# Patient Record
Sex: FEMALE | Race: WHITE | NOT HISPANIC OR LATINO | Employment: OTHER | URBAN - NONMETROPOLITAN AREA
[De-identification: names, ages, dates, MRNs, and addresses within clinical notes are randomized per-mention and may not be internally consistent; named-entity substitution may affect disease eponyms.]

---

## 2017-10-23 ENCOUNTER — AMBULATORY - GICH (OUTPATIENT)
Dept: RADIOLOGY | Facility: OTHER | Age: 62
End: 2017-10-23

## 2017-10-23 DIAGNOSIS — Z12.31 ENCOUNTER FOR SCREENING MAMMOGRAM FOR MALIGNANT NEOPLASM OF BREAST: ICD-10-CM

## 2017-10-26 ENCOUNTER — HOSPITAL ENCOUNTER (OUTPATIENT)
Dept: RADIOLOGY | Facility: OTHER | Age: 62
End: 2017-10-26

## 2017-10-26 ENCOUNTER — HISTORY (OUTPATIENT)
Dept: RADIOLOGY | Facility: OTHER | Age: 62
End: 2017-10-26

## 2017-10-26 DIAGNOSIS — Z12.31 ENCOUNTER FOR SCREENING MAMMOGRAM FOR MALIGNANT NEOPLASM OF BREAST: ICD-10-CM

## 2017-12-27 NOTE — PROGRESS NOTES
Patient Information     Patient Name MRN Sex Jayashree Villa [id number] Female 1955      Progress Notes by Svetlana Rachel at 10/26/2017  9:58 AM     Author:  Svetlana Rachel Service:  (none) Author Type:  (none)     Filed:  10/26/2017  9:58 AM Date of Service:  10/26/2017  9:58 AM Status:  Signed     :  Svetlana Rachel            Falls Risk Criteria:    Age 65 and older or under age 4        Sensory deficits    Poor vision    Use of ambulatory aides    Impaired judgment    Unable to walk independently    Meets High Risk criteria for falls:  no

## 2018-02-07 ENCOUNTER — DOCUMENTATION ONLY (OUTPATIENT)
Dept: FAMILY MEDICINE | Facility: OTHER | Age: 63
End: 2018-02-07

## 2019-05-20 ENCOUNTER — MEDICAL CORRESPONDENCE (OUTPATIENT)
Dept: LAB | Facility: OTHER | Age: 64
End: 2019-05-20

## 2019-05-20 DIAGNOSIS — Z01.812 PRE-PROCEDURE LAB EXAM: ICD-10-CM

## 2019-05-20 LAB
CREAT SERPL-MCNC: 0.67 MG/DL (ref 0.6–1.2)
GFR SERPL CREATININE-BSD FRML MDRD: 89 ML/MIN/{1.73_M2}

## 2019-05-20 PROCEDURE — 82565 ASSAY OF CREATININE: CPT

## 2019-05-20 PROCEDURE — 36415 COLL VENOUS BLD VENIPUNCTURE: CPT

## 2019-05-22 ENCOUNTER — HOSPITAL ENCOUNTER (OUTPATIENT)
Dept: MRI IMAGING | Facility: OTHER | Age: 64
Discharge: HOME | End: 2019-05-22
Attending: FAMILY MEDICINE | Admitting: FAMILY MEDICINE
Payer: COMMERCIAL

## 2019-05-22 DIAGNOSIS — G45.9 TIA (TRANSIENT ISCHEMIC ATTACK): ICD-10-CM

## 2019-05-22 PROCEDURE — A9575 INJ GADOTERATE MEGLUMI 0.1ML: HCPCS | Performed by: RADIOLOGY

## 2019-05-22 PROCEDURE — 70553 MRI BRAIN STEM W/O & W/DYE: CPT

## 2019-05-22 PROCEDURE — 25500064 ZZH RX 255 OP 636: Performed by: RADIOLOGY

## 2019-05-22 RX ADMIN — GADOTERATE MEGLUMINE 17 ML: 376.9 INJECTION INTRAVENOUS at 12:39

## 2020-05-26 ENCOUNTER — HOSPITAL ENCOUNTER (OUTPATIENT)
Dept: GENERAL RADIOLOGY | Facility: OTHER | Age: 65
End: 2020-05-26
Attending: NURSE PRACTITIONER
Payer: COMMERCIAL

## 2020-05-26 ENCOUNTER — OFFICE VISIT (OUTPATIENT)
Dept: FAMILY MEDICINE | Facility: OTHER | Age: 65
End: 2020-05-26
Attending: NURSE PRACTITIONER
Payer: COMMERCIAL

## 2020-05-26 VITALS
TEMPERATURE: 97.8 F | DIASTOLIC BLOOD PRESSURE: 78 MMHG | HEIGHT: 64 IN | BODY MASS INDEX: 32.61 KG/M2 | SYSTOLIC BLOOD PRESSURE: 130 MMHG | HEART RATE: 83 BPM | WEIGHT: 191 LBS

## 2020-05-26 DIAGNOSIS — S62.292A: ICD-10-CM

## 2020-05-26 DIAGNOSIS — S62.92XA CLOSED FRACTURE OF LEFT HAND, INITIAL ENCOUNTER: Primary | ICD-10-CM

## 2020-05-26 DIAGNOSIS — S62.325A CLOSED DISPLACED FRACTURE OF SHAFT OF FOURTH METACARPAL BONE OF LEFT HAND, INITIAL ENCOUNTER: ICD-10-CM

## 2020-05-26 DIAGNOSIS — M79.642 PAIN OF LEFT HAND: ICD-10-CM

## 2020-05-26 PROBLEM — S62.323A CLOSED DISPLACED FRACTURE OF SHAFT OF THIRD METACARPAL BONE OF LEFT HAND, INITIAL ENCOUNTER: Status: ACTIVE | Noted: 2020-05-26

## 2020-05-26 PROBLEM — S62.323A CLOSED DISPLACED FRACTURE OF SHAFT OF THIRD METACARPAL BONE OF LEFT HAND, INITIAL ENCOUNTER: Status: RESOLVED | Noted: 2020-05-26 | Resolved: 2020-05-26

## 2020-05-26 PROCEDURE — 99203 OFFICE O/P NEW LOW 30 MIN: CPT | Performed by: NURSE PRACTITIONER

## 2020-05-26 PROCEDURE — 73130 X-RAY EXAM OF HAND: CPT | Mod: LT

## 2020-05-26 RX ORDER — SPIRONOLACTONE 25 MG/1
25 TABLET ORAL DAILY
COMMUNITY
Start: 2020-05-07

## 2020-05-26 RX ORDER — NADOLOL 40 MG/1
TABLET ORAL
COMMUNITY
Start: 2020-03-20

## 2020-05-26 RX ORDER — FUROSEMIDE 20 MG
20 TABLET ORAL DAILY
COMMUNITY
Start: 2020-04-22

## 2020-05-26 RX ORDER — NADOLOL 20 MG/1
TABLET ORAL
COMMUNITY
Start: 2020-05-05

## 2020-05-26 RX ORDER — MONTELUKAST SODIUM 10 MG/1
1 TABLET ORAL DAILY
COMMUNITY
Start: 2019-09-24

## 2020-05-26 RX ORDER — LOSARTAN POTASSIUM 50 MG/1
100 TABLET ORAL DAILY
COMMUNITY
Start: 2020-03-20

## 2020-05-26 SDOH — HEALTH STABILITY: MENTAL HEALTH: HOW OFTEN DO YOU HAVE A DRINK CONTAINING ALCOHOL?: 2-4 TIMES A MONTH

## 2020-05-26 SDOH — HEALTH STABILITY: MENTAL HEALTH: HOW MANY STANDARD DRINKS CONTAINING ALCOHOL DO YOU HAVE ON A TYPICAL DAY?: 3 OR 4

## 2020-05-26 ASSESSMENT — MIFFLIN-ST. JEOR: SCORE: 1401.37

## 2020-05-26 ASSESSMENT — PAIN SCALES - GENERAL: PAINLEVEL: NO PAIN (0)

## 2020-05-26 NOTE — PATIENT INSTRUCTIONS
Patient Education     Closed Hand Fracture (Adult)  You have a fracture, or broken bone, in your hand. This may be a small crack or chip in the bone. Or it may be a major break with the broken parts pushed out of place. A closed fracture means that the broken bone has not gone through the skin. A hand fracture is often treated with a splint or cast. It usually takes 4 to 6 weeks to heal. Severe injuries may require surgery.  Home care    Keep your arm raised to reduce pain and swelling. When sitting or lying down, raise your arm above heart level. You can do this by placing your arm on a pillow that rests on your chest or on a pillow at your side. This is most important during the first 48 hours after injury.    Apply an ice pack over the injured area for no more than 15 to 20 minutes. Do this every 1 to 2 hours for the first 24 to 48 hours. Continue with ice packs as needed to ease pain and swelling. To make an ice pack, put ice cubes in a plastic bag that seals at the top. Wrap the bag in a clean, thin towel or cloth. Never put ice or an ice pack directly on the skin. You can place the ice pack inside the sling and directly over the cast or splint. As the ice melts, be careful that the cast or splint doesn t get wet.    Keep the cast or splint completely dry at all times. Bathe with your cast or splint out of the water, protected with 2 large plastic bags. Place 1 bag outside the other. Tape each bag with duct tape at the top end or use rubber bands. If a fiberglass cast or splint gets wet, dry it with a hair dryer on a cool setting.    You may use over-the-counter pain medicine to control pain, unless another pain medicine was prescribed. If you have chronic liver or kidney disease or ever had a stomach ulcer or GI bleeding, talk with your provider before using these medicines.    Follow-up care  Follow up with your healthcare provider in 1 week, or as advised. This is to be sure the bone is healing correctly.  If you were given a splint, it may be changed to a cast at your follow-up visit.  If X-rays were taken, you will be told of any new findings that may affect your care.  When to seek medical advice  Call your healthcare provider right away if any of these occur:    The plaster cast or splint becomes wet or soft    The fiberglass cast or splint stays wet for more than 24 hours    The cast or splint has a bad smell    The plaster cast or splint becomes loose    There is increased tightness or pain under the cast or splint    The fingers on your injured hand become swollen, cold, blue, numb, or tingly  Date Last Reviewed: 4/1/2018 2000-2019 [employer]. [address] [place] [address], [place] [address]. All rights reserved. This information is not intended as a substitute for professional medical care. Always follow your healthcare professional's instructions.

## 2020-05-26 NOTE — H&P (VIEW-ONLY)
HPI:    Jayashree Vieira is a 64 year old female who presents to clinic today for left hand injury after a fall.  On Sunday she lost her balance and went to catch herself on her couch.  She missed the cushions and jammed her left hand into the couch frame.  She has had mild pain.  She has significant swelling and bruising especially over her third metacarpal.  Decreased range of motion likely due to swelling.  She has been treating this with Tylenol and ice.  No previous fractures to this hand.    History reviewed. No pertinent past medical history.    History reviewed. No pertinent surgical history.    Family History   Problem Relation Age of Onset     Breast Cancer No family hx of         Cancer-breast       Social History     Socioeconomic History     Marital status: Unknown     Spouse name: Not on file     Number of children: Not on file     Years of education: Not on file     Highest education level: Not on file   Occupational History     Not on file   Social Needs     Financial resource strain: Not on file     Food insecurity     Worry: Not on file     Inability: Not on file     Transportation needs     Medical: Not on file     Non-medical: Not on file   Tobacco Use     Smoking status: Former Smoker     Smokeless tobacco: Never Used   Substance and Sexual Activity     Alcohol use: Yes     Frequency: 2-4 times a month     Drinks per session: 3 or 4     Drug use: Not on file     Sexual activity: Not on file   Lifestyle     Physical activity     Days per week: Not on file     Minutes per session: Not on file     Stress: Not on file   Relationships     Social connections     Talks on phone: Not on file     Gets together: Not on file     Attends Restorationist service: Not on file     Active member of club or organization: Not on file     Attends meetings of clubs or organizations: Not on file     Relationship status: Not on file     Intimate partner violence     Fear of current or ex partner: Not on file      "Emotionally abused: Not on file     Physically abused: Not on file     Forced sexual activity: Not on file   Other Topics Concern     Not on file   Social History Narrative     Not on file       Current Outpatient Medications   Medication Sig Dispense Refill     furosemide (LASIX) 20 MG tablet Take 20 mg by mouth daily       losartan (COZAAR) 50 MG tablet Take 50 mg by mouth daily       montelukast (SINGULAIR) 10 MG tablet Take 1 tablet by mouth daily       nadolol (CORGARD) 20 MG tablet TAKE 1 TABLET BY MOUTH DAILY ALONG WITH 40MG TAB TO EQUAL 60MG       nadolol (CORGARD) 40 MG tablet TAKE 1 TABLET BY MOUTH RICH Y ALONG WITH 20MG TABLET TO EQUAL 60MG TOTAL       spironolactone (ALDACTONE) 25 MG tablet TAKE 1-2 TABLETS BY MOUTH EVERY DAY AS DIRECTED         No Known Allergies    ROS:  Pertinent positives and negatives are noted in HPI.    EXAM:  /78 (BP Location: Right arm, Cuff Size: Adult Regular)   Pulse 83   Temp 97.8  F (36.6  C) (Temporal)   Ht 1.626 m (5' 4\")   Wt 86.6 kg (191 lb)   BMI 32.79 kg/m    General appearance: well appearing female, in no acute distress  Musculoskeletal: Tenderness over third and fourth metacarpals with palpation.  Decreased range of motion, moderate swelling.  She does have bruising over palmar and dorsal aspect of third metacarpal  Dermatological: no rashes or lesions  Psychological: normal affect, alert and pleasant  Xray: xray independently reviewed and fracture appreciated of first, third and fourth metacarpals appreciated; pending radiology over-read    ASSESSMENT AND PLAN:    1. Closed fracture of left hand, initial encounter    2. Pain of left hand    3. Other closed fracture of first metacarpal bone of left hand, unspecified portion of metacarpal, initial encounter    4. Closed displaced fracture of shaft of fourth metacarpal bone of left hand, initial encounter        She was splinted to protect her hand and referred to orthopedics for further evaluation.  " Recommend elevation, ice, Tylenol and ibuprofen as needed.  Ligia Cummings, APRN CNP..................5/26/2020 2:48 PM      This document was prepared using voice generated software.  While every attempt was made for accuracy, grammatical errors may exist.

## 2020-05-26 NOTE — PROGRESS NOTES
HPI:    Jayashree Vieira is a 64 year old female who presents to clinic today for left hand injury after a fall.  On Sunday she lost her balance and went to catch herself on her couch.  She missed the cushions and jammed her left hand into the couch frame.  She has had mild pain.  She has significant swelling and bruising especially over her third metacarpal.  Decreased range of motion likely due to swelling.  She has been treating this with Tylenol and ice.  No previous fractures to this hand.    History reviewed. No pertinent past medical history.    History reviewed. No pertinent surgical history.    Family History   Problem Relation Age of Onset     Breast Cancer No family hx of         Cancer-breast       Social History     Socioeconomic History     Marital status: Unknown     Spouse name: Not on file     Number of children: Not on file     Years of education: Not on file     Highest education level: Not on file   Occupational History     Not on file   Social Needs     Financial resource strain: Not on file     Food insecurity     Worry: Not on file     Inability: Not on file     Transportation needs     Medical: Not on file     Non-medical: Not on file   Tobacco Use     Smoking status: Former Smoker     Smokeless tobacco: Never Used   Substance and Sexual Activity     Alcohol use: Yes     Frequency: 2-4 times a month     Drinks per session: 3 or 4     Drug use: Not on file     Sexual activity: Not on file   Lifestyle     Physical activity     Days per week: Not on file     Minutes per session: Not on file     Stress: Not on file   Relationships     Social connections     Talks on phone: Not on file     Gets together: Not on file     Attends Jain service: Not on file     Active member of club or organization: Not on file     Attends meetings of clubs or organizations: Not on file     Relationship status: Not on file     Intimate partner violence     Fear of current or ex partner: Not on file      "Emotionally abused: Not on file     Physically abused: Not on file     Forced sexual activity: Not on file   Other Topics Concern     Not on file   Social History Narrative     Not on file       Current Outpatient Medications   Medication Sig Dispense Refill     furosemide (LASIX) 20 MG tablet Take 20 mg by mouth daily       losartan (COZAAR) 50 MG tablet Take 50 mg by mouth daily       montelukast (SINGULAIR) 10 MG tablet Take 1 tablet by mouth daily       nadolol (CORGARD) 20 MG tablet TAKE 1 TABLET BY MOUTH DAILY ALONG WITH 40MG TAB TO EQUAL 60MG       nadolol (CORGARD) 40 MG tablet TAKE 1 TABLET BY MOUTH RICH Y ALONG WITH 20MG TABLET TO EQUAL 60MG TOTAL       spironolactone (ALDACTONE) 25 MG tablet TAKE 1-2 TABLETS BY MOUTH EVERY DAY AS DIRECTED         No Known Allergies    ROS:  Pertinent positives and negatives are noted in HPI.    EXAM:  /78 (BP Location: Right arm, Cuff Size: Adult Regular)   Pulse 83   Temp 97.8  F (36.6  C) (Temporal)   Ht 1.626 m (5' 4\")   Wt 86.6 kg (191 lb)   BMI 32.79 kg/m    General appearance: well appearing female, in no acute distress  Musculoskeletal: Tenderness over third and fourth metacarpals with palpation.  Decreased range of motion, moderate swelling.  She does have bruising over palmar and dorsal aspect of third metacarpal  Dermatological: no rashes or lesions  Psychological: normal affect, alert and pleasant  Xray: xray independently reviewed and fracture appreciated of first, third and fourth metacarpals appreciated; pending radiology over-read    ASSESSMENT AND PLAN:    1. Closed fracture of left hand, initial encounter    2. Pain of left hand    3. Other closed fracture of first metacarpal bone of left hand, unspecified portion of metacarpal, initial encounter    4. Closed displaced fracture of shaft of fourth metacarpal bone of left hand, initial encounter        She was splinted to protect her hand and referred to orthopedics for further evaluation.  " Recommend elevation, ice, Tylenol and ibuprofen as needed.  Ligia Cummings, APRN CNP..................5/26/2020 2:48 PM      This document was prepared using voice generated software.  While every attempt was made for accuracy, grammatical errors may exist.

## 2020-05-29 ENCOUNTER — OFFICE VISIT (OUTPATIENT)
Dept: ORTHOPEDICS | Facility: OTHER | Age: 65
End: 2020-05-29
Attending: NURSE PRACTITIONER
Payer: COMMERCIAL

## 2020-05-29 VITALS
SYSTOLIC BLOOD PRESSURE: 168 MMHG | HEART RATE: 60 BPM | WEIGHT: 192 LBS | BODY MASS INDEX: 32.96 KG/M2 | DIASTOLIC BLOOD PRESSURE: 90 MMHG

## 2020-05-29 DIAGNOSIS — S62.92XA CLOSED FRACTURE OF LEFT HAND, INITIAL ENCOUNTER: ICD-10-CM

## 2020-05-29 PROCEDURE — 99202 OFFICE O/P NEW SF 15 MIN: CPT | Performed by: SPECIALIST

## 2020-05-29 NOTE — PROGRESS NOTES
Patient is here for consult on her left hand injury.   Leah Shahid LPN .....................5/29/2020 12:22 PM

## 2020-05-30 NOTE — PROGRESS NOTES
Visit Date:   2020      Jayashree is a 64-year-old right-hand dominant retired [employer] female, fell injuring her left hand on when she lost her balance.  She was seen in the emergency room where x-rays were obtained.  She was splinted, and referred for evaluation.      ALLERGIES:  REVIEWED.      PHYSICAL EXAMINATION:  This is a 64-year-old female, alert and oriented x3, and appropriate.  Gait and station are appropriate, well groomed and well kempt.  Examination of both upper extremities reveals full and symmetric range of motion, shoulders, and elbows.  Examination of both hands and wrists shows left hand was splinted.  Splint is removed.  There was definite shortening of the ring finger metacarpal with a probable rotational deformity.  She also has tenderness with palpation over the dorsum of the hand.      X-rays reviewed including AP, lateral and oblique views of the left hand dated 2020.  These reveal fractures of the index finger, metacarpal neck which is nondisplaced.  There is also a probable avulsion of the radial collateral ligament of the proximal phalanx of the long finger in an oblique fracture of the ring finger with shortening.      IMPRESSION:  Multiple metacarpal fractures involving the left index, long and ring finger.  At this point, I think the index and long fingers can be treated nonoperatively, but the ring finger may have rotational deformity, but definitely a short.  We recommended repair of this.  Risks, complications and benefits reviewed.  This will be scheduled at her convenience.         ENID HAWLEY MD             D: 2020   T: 2020   MT: JOSE      Name:     JAYASHREE CEBALLOS   MRN:      [id number]        Account:      [id number]   :      1955           Visit Date:   2020      Document: [id number]

## 2020-06-01 ENCOUNTER — TRANSFERRED RECORDS (OUTPATIENT)
Dept: HEALTH INFORMATION MANAGEMENT | Facility: CLINIC | Age: 65
End: 2020-06-01

## 2020-06-01 LAB
ALT SERPL-CCNC: 31 U/L (ref 6–29)
AST SERPL-CCNC: 22 U/L (ref 10–35)
CHOLEST SERPL-MCNC: 215 MG/DL
CREAT SERPL-MCNC: 0.59 MG/DL (ref 0.5–0.99)
GFR SERPL CREATININE-BSD FRML MDRD: 97 ML/MIN/1.73M2
GLUCOSE SERPL-MCNC: 99 MG/DL (ref 65–99)
HDLC SERPL-MCNC: 66 MG/DL
LDLC SERPL CALC-MCNC: 128 MG/DL
NONHDLC SERPL-MCNC: 149 MG/DL
POTASSIUM SERPL-SCNC: 4.5 MMOL/L (ref 3.5–5.3)
TRIGL SERPL-MCNC: 104 MG/DL
TSH SERPL-ACNC: 3.71 MIU/L (ref 0.4–4.5)

## 2020-06-02 ENCOUNTER — ANESTHESIA EVENT (OUTPATIENT)
Dept: SURGERY | Facility: OTHER | Age: 65
End: 2020-06-02
Payer: COMMERCIAL

## 2020-06-02 ENCOUNTER — ALLIED HEALTH/NURSE VISIT (OUTPATIENT)
Dept: FAMILY MEDICINE | Facility: OTHER | Age: 65
End: 2020-06-02
Payer: COMMERCIAL

## 2020-06-02 DIAGNOSIS — Z20.822 COVID-19 RULED OUT: Primary | ICD-10-CM

## 2020-06-02 PROCEDURE — C9803 HOPD COVID-19 SPEC COLLECT: HCPCS

## 2020-06-03 RX ORDER — ASPIRIN 81 MG/1
81 TABLET, CHEWABLE ORAL DAILY
COMMUNITY

## 2020-06-05 ENCOUNTER — HOSPITAL ENCOUNTER (OUTPATIENT)
Facility: OTHER | Age: 65
Discharge: HOME | End: 2020-06-05
Attending: SPECIALIST | Admitting: SPECIALIST
Payer: COMMERCIAL

## 2020-06-05 ENCOUNTER — ANESTHESIA (OUTPATIENT)
Dept: SURGERY | Facility: OTHER | Age: 65
End: 2020-06-05
Payer: COMMERCIAL

## 2020-06-05 ENCOUNTER — HOSPITAL ENCOUNTER (OUTPATIENT)
Dept: GENERAL RADIOLOGY | Facility: OTHER | Age: 65
End: 2020-06-05
Attending: SPECIALIST | Admitting: SPECIALIST
Payer: COMMERCIAL

## 2020-06-05 VITALS
OXYGEN SATURATION: 97 % | HEART RATE: 72 BPM | SYSTOLIC BLOOD PRESSURE: 170 MMHG | HEIGHT: 64 IN | DIASTOLIC BLOOD PRESSURE: 95 MMHG | RESPIRATION RATE: 10 BRPM | TEMPERATURE: 97 F | WEIGHT: 192 LBS | BODY MASS INDEX: 32.78 KG/M2

## 2020-06-05 DIAGNOSIS — S62.325D CLOSED DISPLACED FRACTURE OF SHAFT OF FOURTH METACARPAL BONE OF LEFT HAND WITH ROUTINE HEALING, SUBSEQUENT ENCOUNTER: Primary | ICD-10-CM

## 2020-06-05 DIAGNOSIS — Z98.890 S/P ORIF (OPEN REDUCTION INTERNAL FIXATION) FRACTURE: ICD-10-CM

## 2020-06-05 DIAGNOSIS — Z87.81 S/P ORIF (OPEN REDUCTION INTERNAL FIXATION) FRACTURE: ICD-10-CM

## 2020-06-05 PROCEDURE — C1769 GUIDE WIRE: HCPCS | Performed by: SPECIALIST

## 2020-06-05 PROCEDURE — 25000125 ZZHC RX 250: Performed by: SPECIALIST

## 2020-06-05 PROCEDURE — 37000009 ZZH ANESTHESIA TECHNICAL FEE, EACH ADDTL 15 MIN: Performed by: SPECIALIST

## 2020-06-05 PROCEDURE — 36000060 ZZH SURGERY LEVEL 3 W FLUORO 1ST 30 MIN: Performed by: SPECIALIST

## 2020-06-05 PROCEDURE — 25000128 H RX IP 250 OP 636: Performed by: SPECIALIST

## 2020-06-05 PROCEDURE — 26615 TREAT METACARPAL FRACTURE: CPT | Performed by: NURSE ANESTHETIST, CERTIFIED REGISTERED

## 2020-06-05 PROCEDURE — 25000566 ZZH SEVOFLURANE, EA 15 MIN: Performed by: SPECIALIST

## 2020-06-05 PROCEDURE — 71000027 ZZH RECOVERY PHASE 2 EACH 15 MINS: Performed by: SPECIALIST

## 2020-06-05 PROCEDURE — 25000128 H RX IP 250 OP 636: Performed by: NURSE ANESTHETIST, CERTIFIED REGISTERED

## 2020-06-05 PROCEDURE — 25000125 ZZHC RX 250: Performed by: NURSE ANESTHETIST, CERTIFIED REGISTERED

## 2020-06-05 PROCEDURE — 71000014 ZZH RECOVERY PHASE 1 LEVEL 2 FIRST HR: Performed by: SPECIALIST

## 2020-06-05 PROCEDURE — 40000306 ZZH STATISTIC PRE PROC ASSESS II: Performed by: SPECIALIST

## 2020-06-05 PROCEDURE — 25800030 ZZH RX IP 258 OP 636: Performed by: NURSE ANESTHETIST, CERTIFIED REGISTERED

## 2020-06-05 PROCEDURE — 37000008 ZZH ANESTHESIA TECHNICAL FEE, 1ST 30 MIN: Performed by: SPECIALIST

## 2020-06-05 PROCEDURE — 25000132 ZZH RX MED GY IP 250 OP 250 PS 637: Performed by: SPECIALIST

## 2020-06-05 PROCEDURE — 26615 TREAT METACARPAL FRACTURE: CPT | Mod: LT | Performed by: SPECIALIST

## 2020-06-05 PROCEDURE — 40000278 XR SURGERY CARM FLUORO LESS THAN 5 MIN

## 2020-06-05 PROCEDURE — 27210794 ZZH OR GENERAL SUPPLY STERILE: Performed by: SPECIALIST

## 2020-06-05 PROCEDURE — C1713 ANCHOR/SCREW BN/BN,TIS/BN: HCPCS | Performed by: SPECIALIST

## 2020-06-05 PROCEDURE — 25000564 ZZH DESFLURANE, EA 15 MIN: Performed by: SPECIALIST

## 2020-06-05 PROCEDURE — 36000058 ZZH SURGERY LEVEL 3 EA 15 ADDTL MIN: Performed by: SPECIALIST

## 2020-06-05 DEVICE — IMPLANTABLE DEVICE: Type: IMPLANTABLE DEVICE | Site: FINGER | Status: FUNCTIONAL

## 2020-06-05 RX ORDER — NALOXONE HYDROCHLORIDE 0.4 MG/ML
.1-.4 INJECTION, SOLUTION INTRAMUSCULAR; INTRAVENOUS; SUBCUTANEOUS
Status: DISCONTINUED | OUTPATIENT
Start: 2020-06-05 | End: 2020-06-05 | Stop reason: HOSPADM

## 2020-06-05 RX ORDER — FENTANYL CITRATE 50 UG/ML
25-50 INJECTION, SOLUTION INTRAMUSCULAR; INTRAVENOUS EVERY 5 MIN PRN
Status: DISCONTINUED | OUTPATIENT
Start: 2020-06-05 | End: 2020-06-05 | Stop reason: HOSPADM

## 2020-06-05 RX ORDER — HYDROMORPHONE HYDROCHLORIDE 1 MG/ML
.3-.5 INJECTION, SOLUTION INTRAMUSCULAR; INTRAVENOUS; SUBCUTANEOUS EVERY 10 MIN PRN
Status: DISCONTINUED | OUTPATIENT
Start: 2020-06-05 | End: 2020-06-05 | Stop reason: HOSPADM

## 2020-06-05 RX ORDER — HYDROCODONE BITARTRATE AND ACETAMINOPHEN 5; 325 MG/1; MG/1
1-2 TABLET ORAL EVERY 4 HOURS PRN
Qty: 10 TABLET | Refills: 0 | Status: SHIPPED | OUTPATIENT
Start: 2020-06-05

## 2020-06-05 RX ORDER — MAGNESIUM HYDROXIDE 1200 MG/15ML
LIQUID ORAL PRN
Status: DISCONTINUED | OUTPATIENT
Start: 2020-06-05 | End: 2020-06-05 | Stop reason: HOSPADM

## 2020-06-05 RX ORDER — DEXAMETHASONE SODIUM PHOSPHATE 4 MG/ML
INJECTION, SOLUTION INTRA-ARTICULAR; INTRALESIONAL; INTRAMUSCULAR; INTRAVENOUS; SOFT TISSUE PRN
Status: DISCONTINUED | OUTPATIENT
Start: 2020-06-05 | End: 2020-06-05

## 2020-06-05 RX ORDER — SODIUM CHLORIDE, SODIUM LACTATE, POTASSIUM CHLORIDE, CALCIUM CHLORIDE 600; 310; 30; 20 MG/100ML; MG/100ML; MG/100ML; MG/100ML
INJECTION, SOLUTION INTRAVENOUS CONTINUOUS
Status: DISCONTINUED | OUTPATIENT
Start: 2020-06-05 | End: 2020-06-05 | Stop reason: HOSPADM

## 2020-06-05 RX ORDER — LIDOCAINE HYDROCHLORIDE 20 MG/ML
INJECTION, SOLUTION INFILTRATION; PERINEURAL PRN
Status: DISCONTINUED | OUTPATIENT
Start: 2020-06-05 | End: 2020-06-05

## 2020-06-05 RX ORDER — ONDANSETRON 2 MG/ML
INJECTION INTRAMUSCULAR; INTRAVENOUS PRN
Status: DISCONTINUED | OUTPATIENT
Start: 2020-06-05 | End: 2020-06-05

## 2020-06-05 RX ORDER — HYDROCODONE BITARTRATE AND ACETAMINOPHEN 5; 325 MG/1; MG/1
1 TABLET ORAL
Status: COMPLETED | OUTPATIENT
Start: 2020-06-05 | End: 2020-06-05

## 2020-06-05 RX ORDER — ONDANSETRON 4 MG/1
4 TABLET, ORALLY DISINTEGRATING ORAL EVERY 30 MIN PRN
Status: DISCONTINUED | OUTPATIENT
Start: 2020-06-05 | End: 2020-06-05 | Stop reason: HOSPADM

## 2020-06-05 RX ORDER — BUPIVACAINE HYDROCHLORIDE 2.5 MG/ML
INJECTION, SOLUTION EPIDURAL; INFILTRATION; INTRACAUDAL PRN
Status: DISCONTINUED | OUTPATIENT
Start: 2020-06-05 | End: 2020-06-05 | Stop reason: HOSPADM

## 2020-06-05 RX ORDER — ONDANSETRON 2 MG/ML
4 INJECTION INTRAMUSCULAR; INTRAVENOUS EVERY 30 MIN PRN
Status: DISCONTINUED | OUTPATIENT
Start: 2020-06-05 | End: 2020-06-05 | Stop reason: HOSPADM

## 2020-06-05 RX ORDER — FENTANYL CITRATE 50 UG/ML
INJECTION, SOLUTION INTRAMUSCULAR; INTRAVENOUS PRN
Status: DISCONTINUED | OUTPATIENT
Start: 2020-06-05 | End: 2020-06-05

## 2020-06-05 RX ORDER — SODIUM CHLORIDE, SODIUM LACTATE, POTASSIUM CHLORIDE, CALCIUM CHLORIDE 600; 310; 30; 20 MG/100ML; MG/100ML; MG/100ML; MG/100ML
INJECTION, SOLUTION INTRAVENOUS CONTINUOUS PRN
Status: DISCONTINUED | OUTPATIENT
Start: 2020-06-05 | End: 2020-06-05

## 2020-06-05 RX ORDER — CEFAZOLIN SODIUM 1 G/50ML
1 INJECTION, SOLUTION INTRAVENOUS SEE ADMIN INSTRUCTIONS
Status: DISCONTINUED | OUTPATIENT
Start: 2020-06-05 | End: 2020-06-05 | Stop reason: HOSPADM

## 2020-06-05 RX ORDER — GLYCOPYRROLATE 0.2 MG/ML
INJECTION, SOLUTION INTRAMUSCULAR; INTRAVENOUS PRN
Status: DISCONTINUED | OUTPATIENT
Start: 2020-06-05 | End: 2020-06-05

## 2020-06-05 RX ORDER — MEPERIDINE HYDROCHLORIDE 50 MG/ML
12.5 INJECTION INTRAMUSCULAR; INTRAVENOUS; SUBCUTANEOUS
Status: DISCONTINUED | OUTPATIENT
Start: 2020-06-05 | End: 2020-06-05 | Stop reason: HOSPADM

## 2020-06-05 RX ORDER — ACETAMINOPHEN 500 MG
1000 TABLET ORAL EVERY 6 HOURS PRN
COMMUNITY

## 2020-06-05 RX ORDER — PROPOFOL 10 MG/ML
INJECTION, EMULSION INTRAVENOUS PRN
Status: DISCONTINUED | OUTPATIENT
Start: 2020-06-05 | End: 2020-06-05

## 2020-06-05 RX ORDER — LIDOCAINE 40 MG/G
CREAM TOPICAL
Status: DISCONTINUED | OUTPATIENT
Start: 2020-06-05 | End: 2020-06-05 | Stop reason: HOSPADM

## 2020-06-05 RX ADMIN — SODIUM CHLORIDE, POTASSIUM CHLORIDE, SODIUM LACTATE AND CALCIUM CHLORIDE: 600; 310; 30; 20 INJECTION, SOLUTION INTRAVENOUS at 07:36

## 2020-06-05 RX ADMIN — ONDANSETRON 4 MG: 2 INJECTION INTRAMUSCULAR; INTRAVENOUS at 08:13

## 2020-06-05 RX ADMIN — FENTANYL CITRATE 50 MCG: 50 INJECTION, SOLUTION INTRAMUSCULAR; INTRAVENOUS at 08:45

## 2020-06-05 RX ADMIN — SODIUM CHLORIDE, POTASSIUM CHLORIDE, SODIUM LACTATE AND CALCIUM CHLORIDE 100 ML/HR: 600; 310; 30; 20 INJECTION, SOLUTION INTRAVENOUS at 07:03

## 2020-06-05 RX ADMIN — GLYCOPYRROLATE 0.2 MG: 0.2 INJECTION, SOLUTION INTRAMUSCULAR; INTRAVENOUS at 07:40

## 2020-06-05 RX ADMIN — CEFAZOLIN SODIUM 1 G: 1 INJECTION, SOLUTION INTRAVENOUS at 07:36

## 2020-06-05 RX ADMIN — LIDOCAINE HYDROCHLORIDE 100 MG: 20 INJECTION, SOLUTION INFILTRATION; PERINEURAL at 07:45

## 2020-06-05 RX ADMIN — FENTANYL CITRATE 100 MCG: 50 INJECTION, SOLUTION INTRAMUSCULAR; INTRAVENOUS at 07:36

## 2020-06-05 RX ADMIN — FENTANYL CITRATE 50 MCG: 50 INJECTION, SOLUTION INTRAMUSCULAR; INTRAVENOUS at 08:07

## 2020-06-05 RX ADMIN — DEXAMETHASONE SODIUM PHOSPHATE 8 MG: 4 INJECTION, SOLUTION INTRA-ARTICULAR; INTRALESIONAL; INTRAMUSCULAR; INTRAVENOUS; SOFT TISSUE at 08:13

## 2020-06-05 RX ADMIN — PROPOFOL 170 MG: 10 INJECTION, EMULSION INTRAVENOUS at 07:45

## 2020-06-05 RX ADMIN — HYDROCODONE BITARTRATE AND ACETAMINOPHEN 1 TABLET: 5; 325 TABLET ORAL at 09:26

## 2020-06-05 ASSESSMENT — MIFFLIN-ST. JEOR: SCORE: 1405.91

## 2020-06-05 NOTE — OR NURSING
To front entrance of [facility] via W/C.  Friend Farnaz zavala.  Pt given instructions and verbalized understanding.  Phone numbers reviewed and will call if problems arise.

## 2020-06-05 NOTE — ANESTHESIA PREPROCEDURE EVALUATION
"Anesthesia Pre-Procedure Evaluation    Patient: Jayashree Vieira   MRN: [id number] : 1955          Preoperative Diagnosis: Metacarpal bone fracture [S62.309A]    Procedure(s):  OPEN REDUCTION INTERNAL FIXATION, FRACTURE, 4th Metacarpal    History reviewed. No pertinent past medical history.  Past Surgical History:   Procedure Laterality Date     BREAST BIOPSY, RT/LT       TONSILLECTOMY         Anesthesia Evaluation     . Pt has had prior anesthetic.            ROS/MED HX    ENT/Pulmonary:  - neg pulmonary ROS     Neurologic:  - neg neurologic ROS     Cardiovascular:     (+) hypertension----. : . . . :. .       METS/Exercise Tolerance:  4 - Raking leaves, gardening   Hematologic:  - neg hematologic  ROS       Musculoskeletal: Comment: Fx left wrist        GI/Hepatic:  - neg GI/hepatic ROS       Renal/Genitourinary:  - ROS Renal section negative       Endo:  - neg endo ROS       Psychiatric:  - neg psychiatric ROS       Infectious Disease:  - neg infectious disease ROS       Malignancy:      - no malignancy   Other:    - neg other ROS                      Physical Exam  Normal systems: cardiovascular, pulmonary and dental    Airway   Mallampati: II  TM distance: >3 FB  Neck ROM: full    Dental     Cardiovascular   Rhythm and rate: regular and normal      Pulmonary    breath sounds clear to auscultation            Lab Results   Component Value Date    CR 0.67 2019       Preop Vitals  BP Readings from Last 3 Encounters:   20 (!) 189/81   20 (!) 168/90   20 130/78    Pulse Readings from Last 3 Encounters:   20 60   20 83      Resp Readings from Last 3 Encounters:   20 12    SpO2 Readings from Last 3 Encounters:   20 98%      Temp Readings from Last 1 Encounters:   20 98.2  F (36.8  C) (Tympanic)    Ht Readings from Last 1 Encounters:   20 1.626 m (5' 4\")      Wt Readings from Last 1 Encounters:   20 87.1 kg (192 lb)    Estimated body mass index is " "32.96 kg/m  as calculated from the following:    Height as of this encounter: 1.626 m (5' 4\").    Weight as of this encounter: 87.1 kg (192 lb).       Anesthesia Plan      History & Physical Review      ASA Status:  2 .    NPO Status:  > 8 hours    Plan for General (With LMA)            Postoperative Care      Consents  Anesthetic plan, risks, benefits and alternatives discussed with:  Patient..                 David Kellerman, APRN CRNA  "

## 2020-06-05 NOTE — OP NOTE
Procedure Date: 06/05/2020      PREOPERATIVE DIAGNOSIS:  Displaced left ring finger metacarpal fracture with rotational deformity.      POSTOPERATIVE DIAGNOSIS:  Displaced left ring finger metacarpal fracture with rotational deformity.      PROCEDURES PERFORMED:  ORIF left ring finger metacarpal fracture.      SURGEON:  Jerome Escobar MD      ASSISTANT:  none      ANESTHESIA:  General.      INDICATIONS:  This is a 64-year-old female who fell sustaining an index and long finger metacarpal fractures on the left.  The index finger fracture was a neck fracture which was nondisplaced.  The ring finger fracture, however, showed significant rotational deformity and shortening.  Surgical treatment was recommended and the patient elected to proceed.      DESCRIPTION OF PROCEDURE:  Following medical clearance, the patient was brought to the operating room, placed on the operating table.  General anesthesia was induced.  Pneumatic tourniquet was placed about the left upper extremity and the left upper extremity was prepped and draped in normal sterile manner.  A timeout procedure was performed confirming surgical site, patient identity and procedure.  Left upper extremity was then elevated, exsanguinated and the tourniquet was inflated to 250 mmHg.  A standard dorsal incision was made over the ring finger metacarpal, carried sharply down through skin through subcutaneous tissue.  The extensor mechanism was retracted exposing the displaced metacarpal fracture.  The fracture was irrigated, reduced anatomically.  Two ulnar to radial screws were placed through the fracture and a 4 hole plate was selected.  The plate was placed over the fracture and filled with appropriately sized cortical screws with 1 locking screw.  Intraoperative AP, lateral and oblique radiographs reveal anatomic reduction.  Wounds were irrigated, tourniquet was deflated.  Circulation returned promptly to hand and fingers.  Hemostasis obtained with  pressure.  Skin was closed with interrupted Prolene suture.  Sterile dressing was applied.  The hand was splinted, and the patient was brought to the recovery room in stable condition.         ENID HAWLEY MD             D: 2020   T: 2020   MT: RASHID      Name:     ZANE CEBALLOS   MRN:      [id number]        Account:        [id number]   :      1955           Procedure Date: 2020      Document: [id number]

## 2020-06-05 NOTE — ANESTHESIA CARE TRANSFER NOTE
Patient: Jayashree Vieira    Procedure(s):  OPEN REDUCTION INTERNAL FIXATION, FRACTURE, 4th Metacarpal    Diagnosis: Metacarpal bone fracture [S62.309A]  Diagnosis Additional Information: No value filed.    Anesthesia Type:   General     Note:  Airway :Face Mask (Patient spont. breathing well. TV>350. RR 19. Spo2 97%. Patient transported on O2 @ 10L/min via simple mask. Patient cont. on O2 during care transfer, PACU RN afforded questions.)  Patient transferred to:PACU  Handoff Report: Identifed the Patient, Identified the Reponsible Provider, Reviewed the pertinent medical history, Discussed the surgical course, Reviewed Intra-OP anesthesia mangement and issues during anesthesia, Set expectations for post-procedure period and Allowed opportunity for questions and acknowledgement of understanding      Vitals: (Last set prior to Anesthesia Care Transfer)    CRNA VITALS  6/5/2020 0817 - 6/5/2020 0852      6/5/2020             Resp Rate (set):  10                Electronically Signed By: David Kellerman, APRN CRNA  June 5, 2020  8:52 AM

## 2020-06-05 NOTE — DISCHARGE INSTRUCTIONS
Surgeon Contact Information  If you have questions or concerns related to your procedure please contact your surgeon through [facility] at [phone].    [facility]  Adult Discharge Orders & Instructions      For 24 hours after surgery:  1. Get plenty of rest.  A responsible adult must stay with you for at least 24 hours after you leave the hospital.   2. You may feel lightheaded.  IF so, sit for a few minutes before standing.  Have someone help you get up.   3. You may have a slight fever. Call the doctor if your fever is over 101 F (38.3 C) (taken under the tongue) or lasts longer than 24 hours.  4. You may have a dry mouth, a sore throat, muscle aches or trouble sleeping.  These should go away after 24 hours.  5. Do not make important or legal decisions.  6.   Do not drive or use heavy equipment.  If you have weakness or tingling, don't drive or use heavy equipment until this feeling goes away.                                                                                                                                                                         To contact a doctor, call    [phone]______________    Range of Motion:  Touch fingers to thumb, every 2 hours while awake to keep fingers from isrrael.

## 2020-06-05 NOTE — OR NURSING
Left message with Leah at [facility] due to pt having pain at end of splint side on mid posterior splint site, states it is pushing on arm and feels as if her fingers are getting more numb.  Is having increased bruising of fingers at this time, sensation cont to be good and is able to move fingers.

## 2020-06-05 NOTE — ANESTHESIA PROCEDURE NOTES
Airway   Date/Time: 6/5/2020 7:45 AM   Patient location during procedure: OR    General Information and Staff   Performed: CRNA     Consent for Airway   Urgency: elective        Indications and Patient Condition  Indications for airway management: sanjuana-procedural  Induction type:intravenous  Mask difficulty assessment: easy    Final Airway Details  Final airway type: supraglottic airway  Successful airway:LMA  Supraglottic airway: Size 4    Number of attempts at approach: 1      Ease of procedure: easy

## 2020-06-05 NOTE — BRIEF OP NOTE
[facility]    Brief Operative Note    Pre-operative diagnosis: Metacarpal bone fracture [S62.309A]  Post-operative diagnosis Same as pre-operative diagnosis    Procedure: Procedure(s):  OPEN REDUCTION INTERNAL FIXATION, FRACTURE, 4th Metacarpal  Surgeon: Surgeon(s) and Role:     * Jerome Escobar MD - Primary  Anesthesia: General   Estimated blood loss: None  Drains: None  Specimens: * No specimens in log *  Findings:   None.  Complications: None.  Implants:   Implant Name Type Inv. Item Serial No.  Lot No. LRB No. Used Action   2.3mm non locking screws     [employer]OH[employer]  Left 3 Implanted   2.3mm non locking screw     [employer]  Left 1 Implanted   2.5mm emergency screw    [employer]  Left 1 Implanted   2.3mm locking plates     [employer]  Left 1 Implanted   2.3mm locking screw     [employer]  Left 1 Implanted    2.3 non locking screw     [employer]  Left 1 Implanted and Explanted

## 2020-06-05 NOTE — ANESTHESIA POSTPROCEDURE EVALUATION
Patient: Jayashree Vieira    Procedure(s):  OPEN REDUCTION INTERNAL FIXATION, FRACTURE, 4th Metacarpal    Diagnosis:Metacarpal bone fracture [S62.309A]  Diagnosis Additional Information: No value filed.    Anesthesia Type:  General    Note:  Anesthesia Post Evaluation    Patient location during evaluation: Phase 2  Patient participation: Able to fully participate in evaluation  Level of consciousness: awake and alert  Pain management: adequate  Airway patency: patent  Cardiovascular status: acceptable  Respiratory status: acceptable  Hydration status: acceptable  PONV: none     Anesthetic complications: None          Last vitals:  Vitals:    06/05/20 0945 06/05/20 1000 06/05/20 1015   BP: (!) 154/63 (!) 174/72 (!) 170/95   Pulse: 65 60 72   Resp: 12  10   Temp:      SpO2: 94% 95% 97%         Electronically Signed By: LOU TORRES CRNA  June 5, 2020  12:20 PM

## 2020-06-05 NOTE — OR NURSING
Spoke with Dr. Escobar, he instructed me to loosen ace and pad at site that was bothering pt.  Did so and pt is more comfortable.  MD stated that he had injected with marcaine at the end of the procedure and this may be why her thumb and little finger are having some numbness.

## 2020-06-05 NOTE — OR NURSING
Pt able to touch fingers on left hand to thumb, states she had been doing this prior to surgery while in splint.  Having increased pain and given pain pill.  Some bruising present on 3 rd finger, CMS of fingers good, pt denies any numbness or tingling.  Elevation and ice cont.

## 2020-06-09 DIAGNOSIS — Z98.890 S/P ORIF (OPEN REDUCTION INTERNAL FIXATION) FRACTURE: Primary | ICD-10-CM

## 2020-06-09 DIAGNOSIS — Z87.81 S/P ORIF (OPEN REDUCTION INTERNAL FIXATION) FRACTURE: Primary | ICD-10-CM

## 2020-06-12 ENCOUNTER — OFFICE VISIT (OUTPATIENT)
Dept: ORTHOPEDICS | Facility: OTHER | Age: 65
End: 2020-06-12
Attending: SPECIALIST
Payer: COMMERCIAL

## 2020-06-12 ENCOUNTER — HOSPITAL ENCOUNTER (OUTPATIENT)
Dept: GENERAL RADIOLOGY | Facility: OTHER | Age: 65
End: 2020-06-12
Attending: SPECIALIST
Payer: COMMERCIAL

## 2020-06-12 DIAGNOSIS — Z87.81 S/P ORIF (OPEN REDUCTION INTERNAL FIXATION) FRACTURE: ICD-10-CM

## 2020-06-12 DIAGNOSIS — Z98.890 S/P ORIF (OPEN REDUCTION INTERNAL FIXATION) FRACTURE: ICD-10-CM

## 2020-06-12 DIAGNOSIS — Z00.00 ROUTINE GENERAL MEDICAL EXAMINATION AT A HEALTH CARE FACILITY: Primary | ICD-10-CM

## 2020-06-12 PROCEDURE — 99024 POSTOP FOLLOW-UP VISIT: CPT | Performed by: SPECIALIST

## 2020-06-12 PROCEDURE — 73130 X-RAY EXAM OF HAND: CPT | Mod: LT

## 2020-06-12 NOTE — PROGRESS NOTES
Patient is here for follow up on her left hand ORIF.   Leah Shahid LPN .....................6/12/2020 10:27 AM

## 2020-06-19 NOTE — PROGRESS NOTES
Visit Date:   2020      FOLLOWUP VISIT      HISTORY OF PRESENT ILLNESS:  Jayashree returns for followup status post ORIF of her left ring finger metacarpal fracture.  Her date of surgery was 2020.  She is back today doing well.      OBJECTIVE:  Physical examination today confirms her incision to be healing nicely.  She has near full digital range of motion.  There is no evidence of rotational deformity.      IMAGING:  X-rays, AP, lateral and oblique views, of the left hand revealed anatomic reduction of the ring finger metacarpal fracture with appropriate screw length.      IMPRESSION:  Status post ORIF left ring finger metacarpal.  Date of surgery 2020.  We will remove her sutures today, place her in a removable splint.  Encourage range of motion with a repeat examination in 4 weeks with repeat x-rays at that time.         ENID HAWLEY MD             D: 2020   T: 2020   MT: LORENA      Name:     JAYASHREE CEBALLOS   MRN:      [id number]        Account:      [id number]   :      1955           Visit Date:   2020      Document: [id number]

## 2020-07-14 DIAGNOSIS — Z87.81 S/P ORIF (OPEN REDUCTION INTERNAL FIXATION) FRACTURE: Primary | ICD-10-CM

## 2020-07-14 DIAGNOSIS — Z98.890 S/P ORIF (OPEN REDUCTION INTERNAL FIXATION) FRACTURE: Primary | ICD-10-CM

## 2020-07-17 ENCOUNTER — OFFICE VISIT (OUTPATIENT)
Dept: ORTHOPEDICS | Facility: OTHER | Age: 65
End: 2020-07-17
Attending: PSYCHIATRY & NEUROLOGY
Payer: COMMERCIAL

## 2020-07-17 ENCOUNTER — HOSPITAL ENCOUNTER (OUTPATIENT)
Dept: GENERAL RADIOLOGY | Facility: OTHER | Age: 65
End: 2020-07-17
Attending: SPECIALIST
Payer: COMMERCIAL

## 2020-07-17 DIAGNOSIS — Z87.81 S/P ORIF (OPEN REDUCTION INTERNAL FIXATION) FRACTURE: Primary | ICD-10-CM

## 2020-07-17 DIAGNOSIS — Z98.890 S/P ORIF (OPEN REDUCTION INTERNAL FIXATION) FRACTURE: ICD-10-CM

## 2020-07-17 DIAGNOSIS — Z98.890 S/P ORIF (OPEN REDUCTION INTERNAL FIXATION) FRACTURE: Primary | ICD-10-CM

## 2020-07-17 DIAGNOSIS — Z87.81 S/P ORIF (OPEN REDUCTION INTERNAL FIXATION) FRACTURE: ICD-10-CM

## 2020-07-17 PROCEDURE — 99024 POSTOP FOLLOW-UP VISIT: CPT | Performed by: SPECIALIST

## 2020-07-17 PROCEDURE — 73130 X-RAY EXAM OF HAND: CPT | Mod: LT

## 2020-07-17 NOTE — PROGRESS NOTES
Patient is here for follow up on her left hand ORIF.   Leah Shahid LPN .....................7/17/2020 10:46 AM

## 2020-07-17 NOTE — PROGRESS NOTES
Visit Date:   2020      ORTHOPEDIC FOLLOWUP      Jayashree returns for followup status post ORIF of her left ring finger metacarpal fracture.  Date of surgery was 2020.  She is 6 weeks out, doing well.      PHYSICAL EXAMINATION:  Exam today shows her incision to be healing nicely.  She has mild limitation in range of motion.  There is no evidence of rotational deformity.      IMAGING:  Plain film radiographs were reviewed of the left hand, AP, lateral and oblique views of the ring finger metacarpal fracture is an anatomic position.  There is no evidence of failure of fixation.  The fracture appears to be healing.      IMPRESSION:  Status post open reduction internal fixation (ORIF) of left ring finger metacarpal, doing well.      PLAN:  Our plan will be to advance her activities.  We will see her back in 4-6 weeks or on an as-needed.         ENID HAWLEY MD             D: 2020   T: 2020   MT: BENJAMIN      Name:     JAYASHREE CEBALLOS   MRN:      [id number]        Account:      [id number]   :      1955           Visit Date:   2020      Document: [id number]

## 2021-05-20 ENCOUNTER — PATIENT OUTREACH (OUTPATIENT)
Dept: FAMILY MEDICINE | Facility: OTHER | Age: 66
End: 2021-05-20

## 2021-05-20 NOTE — LETTER
[facility]  [address]  [place] [address]  [phone]       May 20, 2021    Jayashree Vieira  [address]  [place] [address]    Dear Jayashree,    We care about your health and have reviewed your health plan and are making recommendations based on this review, to optimize your health.    You are in particular need of attention regarding:  -Colon Cancer Screening    We are recommending that you:  -schedule a COLONOSCOPY to look for colon cancer (due every 10 years or 5 years in higher risk situations.)        Colon cancer is now the second leading cause of cancer-related deaths in the [facility] for both men and women and there are over 130,000 new cases and 50,000 deaths per year from colon cancer.  Colonoscopies can prevent 90-95% of these deaths.  Problem lesions can be removed before they ever become cancer.  This test is not only looking for cancer, but also getting rid of precancerious lesions.    If you are under/uninsured, we recommend you contact the [employer] program. [employer] is a free colorectal cancer screening program that provides colonoscopies for eligible under/uninsured [place] men and women. If you are interested in receiving a free colonoscopy, please call [employer] at [phone] (mention code [employer]) to see if you re eligible.      If you do not wish to do a colonoscopy or cannot afford to do one, at this time, there is another option. It is called a FIT test or Fecal Immunochemical Occult Blood Test (take home stool sample kit).  It does not replace the colonoscopy for colorectal cancer screening, but it can detect hidden bleeding in the lower colon.  It does need to be repeated every year and if a positive result is obtained, you would be referred for a colonoscopy.          If you have completed either one of these tests at another facility, please call with the details of when and where the tests were done and if they were  normal or not. Or have the records sent to our clinic so that we can best coordinate your care.      In addition, here is a list of due or overdue Health Maintenance reminders.    Health Maintenance Due   Topic Date Due     Osteoporosis Screening  Never done     Discuss Advance Care Planning  Never done     Colorectal Cancer Screening  Never done     COVID-19 Vaccine (1) Never done     HIV Screening  Never done     Hepatitis C Screening  Never done     Diptheria Tetanus Pertussis (DTAP/TDAP/TD) Vaccine (1 - Tdap) Never done     Zoster (Shingles) Vaccine (2 of 3) 04/03/2012     Mammogram  10/26/2019     Annual Wellness Visit  Never done     FALL RISK ASSESSMENT  Never done     Pneumococcal Vaccine (1 of 1 - PPSV23) Never done     PHQ-2  01/01/2021       To address the above recommendations, we encourage you to contact us at [phone], or via [facility]. They will assist you with finding the most convenient time and location.    Thank you for trusting [facility] and we appreciate the opportunity to serve you.  We look forward to supporting your healthcare needs in the future.    Healthy Regards,    Your [facility] Team

## 2021-05-20 NOTE — TELEPHONE ENCOUNTER
Patient Quality Outreach      Summary:    Patient has the following on her problem list/HM: None    Patient is due/failing the following:   Colonoscopy    Type of outreach:    Sent letter.    Questions for provider review:    None                                                                                                                                     Noreen Ferguson LPN.......  5/20/2021  3:02 PM

## 2022-11-14 ENCOUNTER — HOSPITAL ENCOUNTER (OUTPATIENT)
Dept: MAMMOGRAPHY | Facility: OTHER | Age: 67
Discharge: HOME | End: 2022-11-14
Attending: FAMILY MEDICINE | Admitting: FAMILY MEDICINE
Payer: MEDICARE

## 2022-11-14 DIAGNOSIS — Z12.31 ENCOUNTER FOR SCREENING MAMMOGRAM FOR MALIGNANT NEOPLASM OF BREAST: ICD-10-CM

## 2022-11-14 PROCEDURE — 77067 SCR MAMMO BI INCL CAD: CPT

## 2023-11-30 ENCOUNTER — OFFICE VISIT (OUTPATIENT)
Dept: FAMILY MEDICINE | Facility: OTHER | Age: 68
End: 2023-11-30
Attending: NURSE PRACTITIONER
Payer: COMMERCIAL

## 2023-11-30 VITALS
HEIGHT: 64 IN | OXYGEN SATURATION: 98 % | WEIGHT: 205.7 LBS | HEART RATE: 60 BPM | DIASTOLIC BLOOD PRESSURE: 88 MMHG | RESPIRATION RATE: 12 BRPM | TEMPERATURE: 97.7 F | BODY MASS INDEX: 35.12 KG/M2 | SYSTOLIC BLOOD PRESSURE: 158 MMHG

## 2023-11-30 DIAGNOSIS — U07.1 INFECTION DUE TO 2019 NOVEL CORONAVIRUS: ICD-10-CM

## 2023-11-30 DIAGNOSIS — R05.1 ACUTE COUGH: Primary | ICD-10-CM

## 2023-11-30 PROCEDURE — G0463 HOSPITAL OUTPT CLINIC VISIT: HCPCS

## 2023-11-30 PROCEDURE — 99213 OFFICE O/P EST LOW 20 MIN: CPT | Performed by: NURSE PRACTITIONER

## 2023-11-30 RX ORDER — ZINC GLUCONATE 50 MG
50 TABLET ORAL DAILY
COMMUNITY

## 2023-11-30 RX ORDER — ATORVASTATIN CALCIUM 10 MG/1
1 TABLET, FILM COATED ORAL
COMMUNITY
Start: 2023-10-24

## 2023-11-30 RX ORDER — MULTIPLE VITAMINS W/ MINERALS TAB 9MG-400MCG
1 TAB ORAL DAILY
COMMUNITY

## 2023-11-30 RX ORDER — ALBUTEROL SULFATE 90 UG/1
2 AEROSOL, METERED RESPIRATORY (INHALATION) EVERY 4 HOURS PRN
Qty: 18 G | Refills: 0 | Status: SHIPPED | OUTPATIENT
Start: 2023-11-30

## 2023-11-30 RX ORDER — ERGOCALCIFEROL (VITAMIN D2) 10 MCG
TABLET ORAL
COMMUNITY

## 2023-11-30 ASSESSMENT — ENCOUNTER SYMPTOMS
HEADACHES: 0
TROUBLE SWALLOWING: 0
APPETITE CHANGE: 0
RHINORRHEA: 1
FATIGUE: 0
CHEST TIGHTNESS: 0
SORE THROAT: 0
WHEEZING: 0
GASTROINTESTINAL NEGATIVE: 1
COUGH: 1
ACTIVITY CHANGE: 0
SHORTNESS OF BREATH: 0
CARDIOVASCULAR NEGATIVE: 1

## 2023-11-30 ASSESSMENT — PAIN SCALES - GENERAL: PAINLEVEL: NO PAIN (0)

## 2023-11-30 NOTE — NURSING NOTE
"Pt presents to  for sore throat and cough. Pt tested positive on Tuesday, but symptoms had started on Saturday. Pt has concerns about symptoms turning into pneumonia, so she wants to be checked out.    Chief Complaint   Patient presents with    Covid Concern     Sore throat and cough       FOOD SECURITY SCREENING QUESTIONS  Hunger Vital Signs:  Within the past 12 months we worried whether our food would run out before we got money to buy more. Never  Within the past 12 months the food we bought just didn't last and we didn't have money to get more. Never  Eri Dearmon 11/30/2023 12:55 PM      Initial BP (!) 158/88 (BP Location: Right arm, Patient Position: Sitting, Cuff Size: Adult Regular)   Pulse 60   Temp 97.7  F (36.5  C) (Tympanic)   Resp 12   Ht 1.626 m (5' 4\")   Wt 93.3 kg (205 lb 11.2 oz)   SpO2 98%   BMI 35.31 kg/m   Estimated body mass index is 35.31 kg/m  as calculated from the following:    Height as of this encounter: 1.626 m (5' 4\").    Weight as of this encounter: 93.3 kg (205 lb 11.2 oz).  Medication Reconciliation: complete    Eri Deabrigid  "

## 2023-11-30 NOTE — PROGRESS NOTES
Jayashree Vieira  1955    ASSESSMENT/PLAN:   1. Acute cough    2. Infection due to 2019 novel coronavirus  - albuterol (PROAIR HFA/PROVENTIL HFA/VENTOLIN HFA) 108 (90 Base) MCG/ACT inhaler; Inhale 2 puffs into the lungs every 4 hours as needed  Dispense: 18 g; Refill: 0    Patient is 5 days into COVID-19 infection, confirmed 2 days ago with at home test.  Patient is not interested in antiviral treatment Paxlovid.    Reassurance provided to patient that her lungs are clear to auscultation, no wheezing or rhonchi, oxygen 98%.  Vital signs are stable, she is afebrile and nontachycardic.  Reviewed with patient that her presenting symptoms are expected course of illness with COVID-19 viral infection.  I have low suspicion for an acute secondary bacterial pneumonia at this point.  I recommend trialing albuterol inhaler as needed for cough and shortness of breath.  She does have Tessalon Perles at home that she can continue using.  She is staying well-hydrated fluids, eating well-balanced diet and taking over-the-counter cough and cold medicines as needed.  We discussed red flag symptoms and when to return for further evaluation if her cough is worsening, shortness of breath develops or she develops a fever at this point in her course of illness.    Patient agrees with plan of care and verbalizes understating. AVS printed. Patient education provided verbally and written instructions provided as requested. Patient made aware of emergent signs and symptoms to monitor for and when to seek additional care/follow up.     SUBJECTIVE:   CHIEF COMPLAINT/ REASON FOR VISIT  Patient presents with:  Covid Concern: Sore throat and cough     HISTORY OF PRESENT ILLNESS  Jayashree Vieira is a pleasant 68 year old female presents to rapid clinic today with concerns for persistent cough.  Patient states she over the weekend on Sunday she started feeling ill and tested +2 days ago on 11/28/2023.  She states she did not take antiviral  "treatment Paxlovid as symptoms were overall mild.  She states she was feeling very unwell earlier this week on Sunday and Monday and symptoms have gradually improved.  She is still feeling congested, coughing but her fever, chills, body aches and sore throat have resolved.  She is staying hydrated fluids, still eating and drinking.  No vomiting or diarrhea.  She denies any shortness of breath or wheezing.  She is concerned and would like to make sure she does not have a pneumonia.    I have reviewed the nursing notes.  I have reviewed allergies, medication list, problem list, and past medical history.    REVIEW OF SYSTEMS  Review of Systems   Constitutional:  Negative for activity change, appetite change and fatigue.   HENT:  Positive for congestion and rhinorrhea. Negative for sore throat and trouble swallowing.    Respiratory:  Positive for cough. Negative for chest tightness, shortness of breath and wheezing.    Cardiovascular: Negative.  Negative for chest pain.   Gastrointestinal: Negative.    Genitourinary: Negative.    Skin:  Negative for rash.   Neurological:  Negative for headaches.   All other systems reviewed and are negative.     VITAL SIGNS  Vitals:    11/30/23 1254   BP: (!) 158/88   BP Location: Right arm   Patient Position: Sitting   Cuff Size: Adult Regular   Pulse: 60   Resp: 12   Temp: 97.7  F (36.5  C)   TempSrc: Tympanic   SpO2: 98%   Weight: 93.3 kg (205 lb 11.2 oz)   Height: 1.626 m (5' 4\")      Body mass index is 35.31 kg/m .    OBJECTIVE:   PHYSICAL EXAM  Physical Exam  Vitals reviewed.   Constitutional:       Appearance: Normal appearance. She is ill-appearing and diaphoretic. She is not toxic-appearing.   HENT:      Head: Normocephalic and atraumatic.      Right Ear: Tympanic membrane, ear canal and external ear normal.      Left Ear: Tympanic membrane, ear canal and external ear normal.      Nose: Congestion present. No rhinorrhea.      Mouth/Throat:      Pharynx: No oropharyngeal exudate " or posterior oropharyngeal erythema.   Eyes:      Conjunctiva/sclera: Conjunctivae normal.   Cardiovascular:      Rate and Rhythm: Normal rate and regular rhythm.      Pulses: Normal pulses.      Heart sounds: Normal heart sounds. No murmur heard.  Pulmonary:      Effort: Pulmonary effort is normal.      Breath sounds: Normal breath sounds. No wheezing.   Musculoskeletal:      Cervical back: Neck supple. No tenderness.   Lymphadenopathy:      Cervical: Cervical adenopathy present.   Skin:     Findings: No rash.   Neurological:      General: No focal deficit present.      Mental Status: She is alert and oriented to person, place, and time.   Psychiatric:         Mood and Affect: Mood normal.         Behavior: Behavior normal.         Thought Content: Thought content normal.         Judgment: Judgment normal.        DIAGNOSTICS  No results found for any visits on 11/30/23.     Desiree Connors NP  [facility]

## 2023-12-01 ENCOUNTER — TELEPHONE (OUTPATIENT)
Dept: FAMILY MEDICINE | Facility: OTHER | Age: 68
End: 2023-12-01
Payer: COMMERCIAL

## 2023-12-01 DIAGNOSIS — R05.1 ACUTE COUGH: Primary | ICD-10-CM

## 2023-12-01 RX ORDER — BENZONATATE 100 MG/1
100 CAPSULE ORAL 3 TIMES DAILY PRN
Qty: 30 CAPSULE | Refills: 0 | Status: SHIPPED | OUTPATIENT
Start: 2023-12-01

## 2023-12-01 NOTE — TELEPHONE ENCOUNTER
Please let patient know that I refilled her Tessalon Perles and this was sent to [facility] pharmacy. LOU Cheng, ANASTACIO  ....................  12/1/2023   10:11 AM

## 2023-12-01 NOTE — TELEPHONE ENCOUNTER
Patient was notified, and verbalized understanding. No further questions at this time.   Marily Moreira LPN on 12/1/2023 at 11:28 AM

## 2023-12-01 NOTE — TELEPHONE ENCOUNTER
Pt has decided she would like to get a refill on her Benzonatate(?).  Pt states that they discussed this at visit yesterday. Please call.    Bean Medina on 12/1/2023 at 9:12 AM

## 2024-10-13 ENCOUNTER — HOSPITAL ENCOUNTER (OUTPATIENT)
Dept: GENERAL RADIOLOGY | Facility: OTHER | Age: 69
Discharge: HOME | End: 2024-10-13
Attending: NURSE PRACTITIONER
Payer: MEDICARE

## 2024-10-13 ENCOUNTER — OFFICE VISIT (OUTPATIENT)
Dept: FAMILY MEDICINE | Facility: OTHER | Age: 69
End: 2024-10-13
Payer: MEDICARE

## 2024-10-13 VITALS
DIASTOLIC BLOOD PRESSURE: 88 MMHG | BODY MASS INDEX: 36.16 KG/M2 | WEIGHT: 211.8 LBS | SYSTOLIC BLOOD PRESSURE: 138 MMHG | RESPIRATION RATE: 16 BRPM | HEIGHT: 64 IN | TEMPERATURE: 98.4 F | HEART RATE: 74 BPM | OXYGEN SATURATION: 97 %

## 2024-10-13 DIAGNOSIS — M79.672 PAIN OF LEFT HEEL: ICD-10-CM

## 2024-10-13 DIAGNOSIS — S82.65XA CLOSED NONDISPLACED FRACTURE OF LATERAL MALLEOLUS OF LEFT FIBULA, INITIAL ENCOUNTER: Primary | ICD-10-CM

## 2024-10-13 DIAGNOSIS — S99.912A ANKLE INJURY, LEFT, INITIAL ENCOUNTER: ICD-10-CM

## 2024-10-13 DIAGNOSIS — M79.645 PAIN OF LEFT MIDDLE FINGER: ICD-10-CM

## 2024-10-13 DIAGNOSIS — S82.892A CLOSED FRACTURE OF LEFT ANKLE, INITIAL ENCOUNTER: ICD-10-CM
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
## 2024-10-13 DIAGNOSIS — M79.675 TOE PAIN, LEFT: ICD-10-CM

## 2024-10-13 DIAGNOSIS — M25.532 LEFT WRIST PAIN: ICD-10-CM

## 2024-10-13 PROCEDURE — G0463 HOSPITAL OUTPT CLINIC VISIT: HCPCS | Mod: 25,27

## 2024-10-13 PROCEDURE — G0463 HOSPITAL OUTPT CLINIC VISIT: HCPCS

## 2024-10-13 PROCEDURE — 73630 X-RAY EXAM OF FOOT: CPT | Mod: LT

## 2024-10-13 PROCEDURE — 73610 X-RAY EXAM OF ANKLE: CPT | Mod: LT

## 2024-10-13 PROCEDURE — 99213 OFFICE O/P EST LOW 20 MIN: CPT | Performed by: NURSE PRACTITIONER

## 2024-10-13 PROCEDURE — 73130 X-RAY EXAM OF HAND: CPT | Mod: LT

## 2024-10-13 RX ORDER — LOSARTAN POTASSIUM 100 MG/1
1 TABLET ORAL
COMMUNITY
Start: 2024-10-01

## 2024-10-13 ASSESSMENT — ENCOUNTER SYMPTOMS
HEMATOLOGIC/LYMPHATIC NEGATIVE: 1
NEUROLOGICAL NEGATIVE: 1
GASTROINTESTINAL NEGATIVE: 1
RESPIRATORY NEGATIVE: 1
CARDIOVASCULAR NEGATIVE: 1
JOINT SWELLING: 1
PSYCHIATRIC NEGATIVE: 1
CONSTITUTIONAL NEGATIVE: 1

## 2024-10-13 ASSESSMENT — PAIN SCALES - GENERAL: PAINLEVEL: MILD PAIN (3)

## 2024-10-13 NOTE — NURSING NOTE
"Chief Complaint   Patient presents with    Pain     Fell last night by missing a step on the stairs in the dark. Left ankle and left hand pain DOI- 10/12/24       Initial /88   Pulse 74   Temp 98.4  F (36.9  C) (Temporal)   Resp 16   Ht 1.626 m (5' 4\")   Wt 96.1 kg (211 lb 12.8 oz)   LMP 10/13/2014 (Approximate)   SpO2 97%   Breastfeeding No   BMI 36.36 kg/m   Estimated body mass index is 36.36 kg/m  as calculated from the following:    Height as of this encounter: 1.626 m (5' 4\").    Weight as of this encounter: 96.1 kg (211 lb 12.8 oz).  Medication Review: complete    The next two questions are to help us understand your food security.  If you are feeling you need any assistance in this area, we have resources available to support you today.           No data to display                  Health Care Directive:  Patient does not have a Health Care Directive or Living Will: Discussed advance care planning with patient; however, patient declined at this time.    Norma J. Gosselin, LPN      "

## 2024-10-13 NOTE — PROGRESS NOTES
Jayashree Vieira  1955    ASSESSMENT/PLAN      Presents to [facility] clinic with left ankle pain, left hand pain after fall last night off the last step.  Patient landed on her left side onto a driveway.  X-ray obtained in [facility] clinic and shows closed nondisplaced fracture of lateral malleolus of left fibula.  Patient placed in walking boot.  Will refer to orthopedic.  Patient's vitals are stable and she appears nontoxic.        1. Closed nondisplaced fracture of lateral malleolus of left fibula, initial encounter  2. Closed fracture of left ankle, initial encounter    - Ankle/Foot Bracing Supplies Order Walking Boot; Left; Pneumatic; Short  - Orthopedic  Referral; Future  - [facility]  - Scheduled Ibuprofen/Tylenol    3. Ankle injury, left, initial encounter  4. Left wrist pain  5. Pain of left middle finger  6. Fall, initial encounter  7. Pain of left heel  8. Toe pain, left    - XR Ankle Left G/E 3 Views  - XR Foot Left G/E 3 Views  - XR Hand Left G/E 3 Views  - May use over-the-counter Tylenol or ibuprofen PRN  - Follow up as needed for new or worsening symptoms        *Explanation of diagnosis, treatment options and risk and benefits of medications reviewed with patient. Patient agrees with plan of care.  *All questions were answered.    *Red flags symptoms were discussed and patient was advised when they should return for reevaluation or for prompt emergency evaluation.   *Patient was given verbal and written instructions on plan of care. Instructions were printed or are available on Mychart on electronic AVS.   *We discussed potential side effects of any prescribed or recommended therapies, as well as expectations for response to treatments.  *Patient discharged in stable condition    Freda Montiel CNP  [facility]    SUBJECTIVE  CHIEF COMPLAINT/ REASON FOR VISIT  Patient presents with:  Pain: Fell last night by missing a step on the stairs in the dark. Left ankle and left hand  "pain DOI- 10/12/24     HISTORY OF PRESENT ILLNESS  Jayashree Vieira is a pleasant 68 year old female presents to rapid clinic today with fall last night off of the last step.  Patient did on her left ankle and left hand.  Left middle finger swollen, bruised.  Left ankle and foot bruised.    I have reviewed the nursing notes.  I have reviewed allergies, medication list, problem list, and past medical history.    REVIEW OF SYSTEMS  Review of Systems   Constitutional: Negative.    HENT: Negative.     Respiratory: Negative.     Cardiovascular: Negative.    Gastrointestinal: Negative.    Genitourinary: Negative.    Musculoskeletal:  Positive for joint swelling.   Skin: Negative.    Neurological: Negative.    Hematological: Negative.    Psychiatric/Behavioral: Negative.     All other systems reviewed and are negative.       VITAL SIGNS  Vitals:    10/13/24 1151   BP: 138/88   Pulse: 74   Resp: 16   Temp: 98.4  F (36.9  C)   TempSrc: Temporal   SpO2: 97%   Weight: 96.1 kg (211 lb 12.8 oz)   Height: 1.626 m (5' 4\")      Body mass index is 36.36 kg/m .      OBJECTIVE  PHYSICAL EXAM  Physical Exam  Vitals and nursing note reviewed.   Constitutional:       Appearance: Normal appearance.   HENT:      Head: Normocephalic.      Nose: Nose normal.      Mouth/Throat:      Mouth: Mucous membranes are moist.   Eyes:      Pupils: Pupils are equal, round, and reactive to light.   Cardiovascular:      Rate and Rhythm: Normal rate.      Pulses: Normal pulses.   Pulmonary:      Effort: Pulmonary effort is normal.   Abdominal:      General: Bowel sounds are normal.   Musculoskeletal:         General: Swelling, tenderness and signs of injury present.      Comments: Left middle finger with ecchymosis, swelling.  Left ankle and foot with ecchymosis and swelling.   Skin:     Capillary Refill: Capillary refill takes less than 2 seconds.      Findings: Bruising present. No erythema or rash.   Neurological:      General: No focal deficit present. "      Mental Status: She is alert.            DIAGNOSTICS  Results for orders placed or performed in visit on 10/13/24   XR Ankle Left G/E 3 Views     Status: None    Narrative    PROCEDURE:  XR ANKLE LEFT G/E 3 VIEWS    HISTORY: Ankle injury, left, initial encounter; Fall, initial  encounter; Pain of left heel.    COMPARISON:  None.    TECHNIQUE:  3 views left ankle.    FINDINGS:  There is an acute nondisplaced fracture of the lateral  malleolus.     There is diffuse soft tissue swelling. Plantar calcaneal spurring is  seen.     ED CONNOR MD         SYSTEM ID:  [id number]   XR Foot Left G/E 3 Views     Status: None    Narrative    PROCEDURE:  XR FOOT LEFT G/E 3 VIEWS    HISTORY: Fall, initial encounter; Toe pain, left.    COMPARISON:  None.    TECHNIQUE:  3 views of the left foot.    FINDINGS:  No fracture or dislocation is identified. Plantar spurring  is noted. No foreign body is seen.       Impression    IMPRESSION: No acute fracture.      ED CONNOR MD         SYSTEM ID:  [id number]   XR Hand Left G/E 3 Views     Status: None    Narrative    PROCEDURE:  XR HAND LEFT G/E 3 VIEWS    HISTORY: Left wrist pain; Pain of left middle finger; Fall, initial  encounter.    COMPARISON:  7/17/2020    TECHNIQUE:  3 views left hand.    FINDINGS:  No acute fracture or dislocation is identified. Chronic  deformity of the proximal phalanx of the third digit as well as the  third and fourth metacarpals is redemonstrated. 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 No foreign body is seen.       Impression    IMPRESSION: No acute fracture.      ED CONNOR MD         SYSTEM ID:  [id number]

## 2024-10-17 ENCOUNTER — OFFICE VISIT (OUTPATIENT)
Dept: FAMILY MEDICINE | Facility: OTHER | Age: 69
End: 2024-10-17
Attending: NURSE PRACTITIONER
Payer: MEDICARE

## 2024-10-17 ENCOUNTER — HOSPITAL ENCOUNTER (OUTPATIENT)
Dept: GENERAL RADIOLOGY | Facility: OTHER | Age: 69
Discharge: HOME | End: 2024-10-17
Attending: FAMILY MEDICINE
Payer: MEDICARE

## 2024-10-17 VITALS
WEIGHT: 212.2 LBS | OXYGEN SATURATION: 98 % | TEMPERATURE: 98.6 F | SYSTOLIC BLOOD PRESSURE: 134 MMHG | BODY MASS INDEX: 36.42 KG/M2 | DIASTOLIC BLOOD PRESSURE: 80 MMHG | HEART RATE: 56 BPM | RESPIRATION RATE: 18 BRPM

## 2024-10-17 DIAGNOSIS — S82.65XA CLOSED NONDISPLACED FRACTURE OF LATERAL MALLEOLUS OF LEFT FIBULA, INITIAL ENCOUNTER: Primary | ICD-10-CM

## 2024-10-17 PROCEDURE — G0463 HOSPITAL OUTPT CLINIC VISIT: HCPCS | Mod: 25

## 2024-10-17 PROCEDURE — 73610 X-RAY EXAM OF ANKLE: CPT | Mod: LT

## 2024-10-17 PROCEDURE — 27786 TREATMENT OF ANKLE FRACTURE: CPT | Performed by: FAMILY MEDICINE

## 2024-10-17 ASSESSMENT — PAIN SCALES - GENERAL: PAINLEVEL: MILD PAIN (3)

## 2024-10-17 NOTE — PROGRESS NOTES
Sports Medicine Office Note    HPI:  60-year-old female coming in for evaluation of a left ankle injury that occurred on 10/12.  She suffered a fall going down the steps, she missed the last step.  She was seen in rapid clinic on 10/13.  She was found to have a lateral malleolus fracture.  She was placed in a walking boot.  She has been wearing this fairly consistently.  She endorses bruising and swelling.  She rates her pain at 3-/10.  She characterized the pain as achy.  Most of her symptoms are about the lateral aspect of the ankle.      EXAM:  /80 (BP Location: Right arm, Patient Position: Sitting, Cuff Size: Adult Regular)   Pulse 56   Temp 98.6  F (37  C) (Temporal)   Resp 18   Wt 96.3 kg (212 lb 3.2 oz)   LMP 10/13/2014 (Approximate)   SpO2 98%   BMI 36.42 kg/m    MUSCULOSKELETAL EXAM:  LEFT FOOT AND ANKLE  Inspection:  -No gross deformity  -Mild swelling compared to contralateral side  -Moderate bruising throughout the lower leg extending down into the midfoot/digits  -Scars:  None    Tenderness to palpation of the:  -Fibular head:  Negative  -Fibular shaft:  Negative  -Tibial shaft:  Negative  -Medial malleolus:  Negative  -Deltoid ligament:  Negative  -Lateral malleolus: Positive  -ATFL: Positive  -CFL: Positive  -PTFL:  Negative  -Syndesmosis (AITFL): Mild pain  -Midsubstance Achilles tendon:  Negative  -Achilles tendon insertion:  Negative  -Plantar fascial origin on the calcaneus:  Negative  -Base of the fifth metatarsal:  Negative  -Navicular:  Negative  -Lisfranc joint:  Negative  -Metatarsals:  Negative    Special Tests:  -Kleiger's external rotation test: Positive    Other:  -Intact sensation to light touch distally.  -No signs of cyanosis. Normal skin temperature.  -Knee:  No gross deformity. Normal motion.  -Right foot/ankle:  No gross deformity. No palpable tenderness. Normal strength.      IMAGING:  10/13/2024: 3 view left ankle x-ray  - Collins B lateral malleolus fracture.  Mortise  appears intact.  No other acute bony abnormalities.    10/13/2024: 3 view left foot x-ray  - Lateral malleolus fracture is again seen.  Mild diffuse degenerative changes.  Enthesophyte off of the plantar fascia origin on the calcaneus.    10/17/2024: 4 view left foot x-ray  - Lateral malleolus fracture is noted.  The fracture line seems to be more evident lower than when seen on initial left ankle x-rays from 10/13 suggesting a Collins A fracture.      ASSESSMENT/PLAN:  Diagnoses and all orders for this visit:  Closed nondisplaced fracture of lateral malleolus of left fibula, initial encounter  -     XR Ankle Left G/E 3 Views  -     Orthopedic  Referral  -     CLOSED TX DISTAL FIBULA FX W/O MANIPULATION    68-year-old female with a closed, nondisplaced left lateral malleolus fracture.  X-rays from 10/13 and 10/17 were both personally reviewed in the office with the findings as demonstrated above by my interpretation.  She is now 5 days out from her injury.  She meets criteria for nonoperative management.  - Continue in the walking boot  - Ice, elevation, and OTC analgesics as needed  - Follow-up in 2 weeks for repeat x-rays out of the boot  - Follow-up 4 weeks after that for another set of repeat x-rays and likely transition away from boot immobilization at that time      Chad Lezama MD  10/17/2024  9:25 AM    Total time spent with this patient was 26 minutes which included chart review, visualization and independent interpretation of images, time spent with the patient, and documentation.    Procedure time:  0 minute(s)

## 2024-10-30 ENCOUNTER — OFFICE VISIT (OUTPATIENT)
Dept: FAMILY MEDICINE | Facility: OTHER | Age: 69
End: 2024-10-30
Attending: FAMILY MEDICINE
Payer: MEDICARE

## 2024-10-30 ENCOUNTER — HOSPITAL ENCOUNTER (OUTPATIENT)
Dept: GENERAL RADIOLOGY | Facility: OTHER | Age: 69
Discharge: HOME | End: 2024-10-30
Attending: FAMILY MEDICINE
Payer: MEDICARE

## 2024-10-30 VITALS
BODY MASS INDEX: 37.18 KG/M2 | TEMPERATURE: 97 F | SYSTOLIC BLOOD PRESSURE: 132 MMHG | HEART RATE: 58 BPM | RESPIRATION RATE: 16 BRPM | DIASTOLIC BLOOD PRESSURE: 80 MMHG | WEIGHT: 216.6 LBS | OXYGEN SATURATION: 97 %

## 2024-10-30 DIAGNOSIS — S82.65XD CLOSED NONDISPLACED FRACTURE OF LATERAL MALLEOLUS OF LEFT FIBULA WITH ROUTINE HEALING, SUBSEQUENT ENCOUNTER: Primary | ICD-10-CM

## 2024-10-30 PROCEDURE — 99207 PR FRACTURE CARE IN GLOBAL PERIOD: CPT | Performed by: FAMILY MEDICINE

## 2024-10-30 PROCEDURE — G0463 HOSPITAL OUTPT CLINIC VISIT: HCPCS

## 2024-10-30 PROCEDURE — 73610 X-RAY EXAM OF ANKLE: CPT | Mod: LT

## 2024-10-30 ASSESSMENT — PAIN SCALES - GENERAL: PAINLEVEL_OUTOF10: MILD PAIN (2)

## 2024-10-30 NOTE — PROGRESS NOTES
Sports Medicine Office Note    HPI:  60-year-old female coming in for follow-up evaluation of a left ankle injury that occurred on 10/12.  She suffered a fall going down the steps, missing the last step.  She was seen in [facility] clinic on 10/13 and was found to have a lateral malleolus fracture.  She was placed in a walking boot.  She followed up in this office on 10/17.  The walking boot was continued.  She has tolerated this well.  She currently endorses 2/10 pain.  She characterizes the pain as dull and achy.  Her pain is mostly over the lateral aspect of the ankle.  She has been using Tylenol and ibuprofen to help with her symptoms.      EXAM:  /80 (BP Location: Right arm, Patient Position: Sitting, Cuff Size: Adult Large)   Pulse 58   Temp 97  F (36.1  C) (Temporal)   Resp 16   Wt 98.2 kg (216 lb 9.6 oz)   LMP 10/13/2014 (Approximate)   SpO2 97%   BMI 37.18 kg/m    MUSCULOSKELETAL EXAM:  LEFT FOOT AND ANKLE  Inspection:  -No gross deformity  -No bruising or swelling  -Scars:  None    Tenderness to palpation of the:  -Fibular head:  Negative  -Fibular shaft:  Negative  -Tibial shaft:  Negative  -Medial malleolus:  Negative  -Deltoid ligament:  Negative  -Lateral malleolus: Positive  -ATFL:  Negative  -CFL:  Negative  -PTFL:  Negative  -Syndesmosis (AITFL):  Negative  -Midsubstance Achilles tendon:  Negative  -Achilles tendon insertion:  Negative  -Plantar fascial origin on the calcaneus:  Negative  -Base of the fifth metatarsal:  Negative  -Navicular:  Negative  -Lisfranc joint:  Negative  -Metatarsals:  Negative    Strength:  -Dorsiflexion:  5/5  -Plantarflexion:  5/5  -Inversion:  5/5  -Eversion:  5/5    Other:  -Intact sensation to light touch distally.  -No signs of cyanosis. Normal skin temperature.  -Knee:  No gross deformity. Normal motion.  -Right foot/ankle:  No gross deformity. No palpable tenderness. Normal strength.      IMAGING:  10/13/2024: 3 view left ankle x-ray  - Collins B lateral  malleolus fracture.  Mortise appears intact.  No other acute bony abnormalities.     10/13/2024: 3 view left foot x-ray  - Lateral malleolus fracture is again seen.  Mild diffuse degenerative changes.  Enthesophyte off of the plantar fascia origin on the calcaneus.     10/17/2024: 4 view left ankle x-ray  - Lateral malleolus fracture is noted.  The fracture line seems to be more evident lower than when seen on initial left ankle x-rays from 10/13 suggesting a Collins A fracture.    10/30/2024: 3 view left ankle x-ray  - Lateral malleolus fracture is again noted.  No change in bony alignment.  Small amount of interval bony callus formation is demonstrated.      ASSESSMENT/PLAN:  Diagnoses and all orders for this visit:  Closed nondisplaced fracture of lateral malleolus of left fibula with routine healing, subsequent encounter  -     XR Ankle Left G/E 3 Views  -     Physical Therapy  Referral; Future    68-year-old female with a closed, nondisplaced left lateral malleolus fracture.  X-rays from 10/13, 10/17, and 10/30 were all personally reviewed in the office with the findings as demonstrated above by my interpretation.  She is now 2 weeks and 4 days out from her injury.  We will continue nonoperative treatment.  - Continue in the walking boot  - OTC analgesics as needed  - Discussed beginning ABC exercises  - Referral placed to physical therapy to begin after follow-up visit  - Follow-up in 4 weeks for repeat x-rays out of the boot and likely transition away from boot immobilization at that time      Chad Lezama MD  10/30/2024  8:11 AM    Total time spent with this patient was 23 minutes which included chart review, visualization and independent interpretation of images, time spent with the patient, and documentation.    Procedure time:  0 minute(s)

## 2024-11-26 ENCOUNTER — OFFICE VISIT (OUTPATIENT)
Dept: FAMILY MEDICINE | Facility: OTHER | Age: 69
End: 2024-11-26
Attending: FAMILY MEDICINE
Payer: MEDICARE

## 2024-11-26 ENCOUNTER — HOSPITAL ENCOUNTER (OUTPATIENT)
Dept: GENERAL RADIOLOGY | Facility: OTHER | Age: 69
Discharge: HOME | End: 2024-11-26
Attending: FAMILY MEDICINE
Payer: MEDICARE

## 2024-11-26 VITALS
SYSTOLIC BLOOD PRESSURE: 130 MMHG | HEART RATE: 58 BPM | WEIGHT: 216 LBS | OXYGEN SATURATION: 96 % | RESPIRATION RATE: 16 BRPM | TEMPERATURE: 98 F | BODY MASS INDEX: 37.08 KG/M2 | DIASTOLIC BLOOD PRESSURE: 78 MMHG

## 2024-11-26 DIAGNOSIS — S82.65XD CLOSED NONDISPLACED FRACTURE OF LATERAL MALLEOLUS OF LEFT FIBULA WITH ROUTINE HEALING: Primary | ICD-10-CM

## 2024-11-26 PROCEDURE — 73610 X-RAY EXAM OF ANKLE: CPT | Mod: LT

## 2024-11-26 PROCEDURE — G0463 HOSPITAL OUTPT CLINIC VISIT: HCPCS

## 2024-11-26 ASSESSMENT — PAIN SCALES - GENERAL: PAINLEVEL_OUTOF10: NO PAIN (0)

## 2024-11-26 NOTE — PROGRESS NOTES
Sports Medicine Office Note    HPI:  69-year-old female coming in for follow-up evaluation of a left ankle injury that occurred on 10/12.  She suffered a fall going down steps, missing the last step.  She was seen in [facility] clinic on 11/13 and was found to have a lateral malleolus fracture.  She followed up in this office on 10/17 and 10/30.  She is currently being treated in a walking boot.  She is not endorsing any pain.  No new injury.      EXAM:  /78 (BP Location: Right arm, Patient Position: Sitting, Cuff Size: Adult Large)   Pulse 58   Temp 98  F (36.7  C) (Temporal)   Resp 16   Wt 98 kg (216 lb)   LMP 10/13/2014 (Approximate)   SpO2 96%   BMI 37.08 kg/m    MUSCULOSKELETAL EXAM:  LEFT FOOT AND ANKLE  Inspection:  -No gross deformity  -No bruising or swelling  -Scars:  None    Tenderness to palpation of the:  -Fibular head:  Negative  -Fibular shaft:  Negative  -Tibial shaft:  Negative  -Medial malleolus:  Negative  -Deltoid ligament:  Negative  -Lateral malleolus:  Negative  -ATFL:  Negative  -CFL:  Negative  -PTFL:  Negative  -Syndesmosis (AITFL):  Negative  -Midsubstance Achilles tendon:  Negative  -Achilles tendon insertion:  Negative  -Plantar fascial origin on the calcaneus:  Negative  -Base of the fifth metatarsal:  Negative  -Navicular:  Negative  -Lisfranc joint:  Negative  -Metatarsals:  Negative    Strength:  -Dorsiflexion:  5/5  -Plantarflexion:  5/5  -Inversion:  5/5  -Eversion:  5/5    Other:  -Intact sensation to light touch distally.  -No signs of cyanosis. Normal skin temperature.  -Knee:  No gross deformity. Normal motion.  -Right foot/ankle:  No gross deformity. No palpable tenderness. Normal strength.      IMAGING:  10/13/2024: 3 view left ankle x-ray  - Collins B lateral malleolus fracture.  Mortise appears intact.  No other acute bony abnormalities.     10/13/2024: 3 view left foot x-ray  - Lateral malleolus fracture is again seen.  Mild diffuse degenerative changes.   Enthesophyte off of the plantar fascia origin on the calcaneus.     10/17/2024: 4 view left ankle x-ray  - Lateral malleolus fracture is noted.  The fracture line seems to be more evident lower than when seen on initial left ankle x-rays from 10/13 suggesting a Collins A fracture.     10/30/2024: 3 view left ankle x-ray  - Lateral malleolus fracture is again noted.  No change in bony alignment.  Small amount of interval bony callus formation is demonstrated.    11/26/2024: 3 view left ankle x-ray  - Lateral malleolus fracture is again noted.  No change in bony alignment.  Interval bony callus formation is demonstrated.      ASSESSMENT/PLAN:  Diagnoses and all orders for this visit:  Closed nondisplaced fracture of lateral malleolus of left fibula with routine healing  -     XR Ankle Left G/E 3 Views    69-year-old female with a closed, nondisplaced left lateral malleolus fracture.  X-rays from 10/13, 10/17, 10/30, and 11/26 were all personally reviewed in the office with the findings as demonstrated above by my interpretation.  She is now 6 weeks and 3 days out from her injury.  - Transition away from the walking boot  - Begin physical therapy (has first session on 12/2)  - Follow-up as needed      Chad Lezama MD  11/26/2024  1:00 PM    Total time spent with this patient was 8 minutes which included chart review, visualization and independent interpretation of images, time spent with the patient, and documentation.    Procedure time:  0 minute(s)

## 2024-12-02 ENCOUNTER — THERAPY VISIT (OUTPATIENT)
Dept: PHYSICAL THERAPY | Facility: OTHER | Age: 69
End: 2024-12-02
Attending: FAMILY MEDICINE
Payer: COMMERCIAL

## 2024-12-02 DIAGNOSIS — S82.65XD CLOSED NONDISPLACED FRACTURE OF LATERAL MALLEOLUS OF LEFT FIBULA WITH ROUTINE HEALING, SUBSEQUENT ENCOUNTER: ICD-10-CM

## 2024-12-02 NOTE — PROGRESS NOTES
PHYSICAL THERAPY EVALUATION  Type of Visit: Evaluation       Fall Risk Screen:  Fall screen completed by: PT  Have you fallen 2 or more times in the past year?: No  Have you fallen and had an injury in the past year?: Yes  Is patient a fall risk?: Department fall risk interventions implemented    Subjective         Presenting condition or subjective complaint: Recent left ankle fracture    Patient is status postClosed nondisplaced fracture of lateral malleolus of left fibula with routine healing, subsequent encounter (S82.65XD) as a result of missing last step on stairs and following on 10/12/2024.  Patient recently had follow-up with Dr. Ledezma and was given the  okay to wean from the walking boot.  Patient reports that she has been weaning from the walking boot without difficulty.  Patient reports she has minimal discomfort at this time.  She feels she is progressing well at this time.  She is motivated to start PT.    Date of onset: 10/12/24    Relevant medical history:   Reviewed medical chart  Dates & types of surgery:  Reviewed medical chart    Prior diagnostic imaging/testing results:     Reviewed medical chart  Prior therapy history for the same diagnosis, illness or injury: No      Prior Level of Function  No prior function limitations reported.      Patient goals for therapy: Return to walking and prior level activity without difficulty         Objective   FOOT/ANKLE EVALUATION  PAIN: Pain Level at Rest: 0/10  Pain Level with Use: 1/10  Pain Location: Left lateral ankle  Pain Quality: None  Pain Frequency: When wearing her shoe  Pain is Exacerbated By: Standing/walking  Pain is Relieved By: otc medications and elevating left lower extremity  INTEGUMENTARY (edema, incisions):  Edema noted left ankle.  Figure 8 measurement was 58 cm    GAIT: Mildly antalgic gait pattern with decreased pushoff on the left.    ROM:   Left ankle range of motion:  Dorsiflexion: 0 degrees  Plantarflexion: 50 degrees  Inversion:  30 degrees  Eversion: 12 degrees    STRENGTH:   Left ankle strength  Dorsiflexion: 4+/5  Plantarflexion: 4+/5  Inversion: 4/5  Eversion: 4/5    FLEXIBILITY: Left gastrocsoleus tightness noted    SPECIAL TESTS: Not indicated due to current diagnosis    PALPATION: Mild tenderness to palpation noted over the left ATF and CF ligaments.  Mild tenderness over the distal left fibula.      Assessment & Plan   CLINICAL IMPRESSIONS  Medical Diagnosis: Closed nondisplaced fracture of lateral malleolus of left fibula with routine healing, subsequent encounter (S82.65XD)    Treatment Diagnosis: Left ankle pain, impaired range of motion, weakness, impaired neuromuscular control, impaired proprioception   Impression/Assessment: Patient is a 69 year old female with status post fracture left distal fibula complaints.  The following significant findings have been identified: Pain, Decreased ROM/flexibility, Decreased joint mobility, Decreased strength, Decreased proprioception, Edema, Impaired gait, Impaired muscle performance, and Decreased activity tolerance. These impairments interfere with their ability to perform self care tasks, recreational activities, household chores, household mobility, and community mobility as compared to previous level of function.     Clinical Decision Making (Complexity):  Clinical Presentation: Stable/Uncomplicated  Clinical Presentation Rationale: based on medical and personal factors listed in PT evaluation  Clinical Decision Making (Complexity): Low complexity    PLAN OF CARE  Treatment Interventions:  Modalities: Cryotherapy, Vasoneumatic Device  Interventions: Gait Training, Manual Therapy, Neuromuscular Re-education, Therapeutic Exercise    Long Term Goals     PT Goal 1  Goal Identifier: Left ankle pain  Goal Description: Patient will report a 75% or greater reduction of left ankle pain to allow walking for 30 minutes to complete grocery shopping tasks without difficulty.  Rationale: to maximize  safety and independence with performance of ADLs and functional tasks;to maximize safety and independence within the community  Target Date: 01/27/25  PT Goal 2  Goal Identifier: Left ankle strength  Goal Description: Patient will demonstrate 5/5 grade left ankle strength to allow walking over uneven surfaces without difficulty or feeling of instability.  Rationale: to maximize safety and independence with performance of ADLs and functional tasks;to maximize safety and independence within the home;to maximize safety and independence within the community  Target Date: 01/27/25  PT Goal 3  Goal Identifier: Left ankle range of motion  Goal Description: Patient will demonstrate a 5 degree improvement in left ankle dorsiflexion to allow improved heel-toe gait pattern.  Rationale: to maximize safety and independence with performance of ADLs and functional tasks;to maximize safety and independence within the home;to maximize safety and independence within the community;to maximize safety and independence with self cares  Target Date: 01/27/25      Frequency of Treatment: 16 visits  Duration of Treatment: 8 weeks      Education Assessment:   Learner/Method: Patient;Listening;Reading;Demonstration;Pictures/Video;No Barriers to Learning    Risks and benefits of evaluation/treatment have been explained.   Patient/Family/caregiver agrees with Plan of Care.     Evaluation Time:     PT Eval, Low Complexity Minutes (47473): 15    Signing Clinician: Gilberto Alvarado PT        [facility]                                                                                   OUTPATIENT PHYSICAL THERAPY      PLAN OF TREATMENT FOR OUTPATIENT REHABILITATION   Patient's Last Name, First Name, Jayashree Farrar YOB: 1955   Provider's Name   [facility]   Medical Record No.  [id number]     Onset Date: 10/12/24  Start of Care Date: 12/02/24     Medical Diagnosis:   Closed nondisplaced fracture of lateral malleolus of left fibula with routine healing, subsequent encounter (S82.65XD)      PT Treatment Diagnosis:  Left ankle pain, impaired range of motion, weakness, impaired neuromuscular control, impaired proprioception Plan of Treatment  Frequency/Duration: 16 visits/ 8 weeks    Certification date from 12/02/24 to 01/27/25         See note for plan of treatment details and functional goals     Gilberto Alvarado, PT                         I CERTIFY THE NEED FOR THESE SERVICES FURNISHED UNDER        THIS PLAN OF TREATMENT AND WHILE UNDER MY CARE     (Physician attestation of this document indicates review and certification of the therapy plan).              Referring Provider:  Chad Ledezma    Initial Assessment  See Epic Evaluation- Start of Care Date: 12/02/24

## 2024-12-05 ENCOUNTER — THERAPY VISIT (OUTPATIENT)
Dept: PHYSICAL THERAPY | Facility: OTHER | Age: 69
End: 2024-12-05
Attending: FAMILY MEDICINE
Payer: COMMERCIAL

## 2024-12-05 DIAGNOSIS — S82.65XD CLOSED NONDISPLACED FRACTURE OF LATERAL MALLEOLUS OF LEFT FIBULA WITH ROUTINE HEALING, SUBSEQUENT ENCOUNTER: Primary | ICD-10-CM

## 2024-12-09 ENCOUNTER — THERAPY VISIT (OUTPATIENT)
Dept: PHYSICAL THERAPY | Facility: OTHER | Age: 69
End: 2024-12-09
Attending: FAMILY MEDICINE
Payer: COMMERCIAL

## 2024-12-09 DIAGNOSIS — S82.65XD CLOSED NONDISPLACED FRACTURE OF LATERAL MALLEOLUS OF LEFT FIBULA WITH ROUTINE HEALING, SUBSEQUENT ENCOUNTER: Primary | ICD-10-CM

## 2025-05-21 ENCOUNTER — OFFICE VISIT (OUTPATIENT)
Dept: FAMILY MEDICINE | Facility: OTHER | Age: 70
End: 2025-05-21
Payer: MEDICARE

## 2025-05-21 ENCOUNTER — HOSPITAL ENCOUNTER (OUTPATIENT)
Dept: GENERAL RADIOLOGY | Facility: OTHER | Age: 70
Discharge: HOME | End: 2025-05-21
Attending: NURSE PRACTITIONER
Payer: MEDICARE

## 2025-05-21 VITALS
TEMPERATURE: 97.5 F | HEART RATE: 71 BPM | OXYGEN SATURATION: 100 % | SYSTOLIC BLOOD PRESSURE: 152 MMHG | BODY MASS INDEX: 37.22 KG/M2 | RESPIRATION RATE: 20 BRPM | DIASTOLIC BLOOD PRESSURE: 96 MMHG | HEIGHT: 64 IN | WEIGHT: 218 LBS

## 2025-05-21 DIAGNOSIS — R05.2 SUBACUTE COUGH: ICD-10-CM

## 2025-05-21 DIAGNOSIS — R06.02 SOB (SHORTNESS OF BREATH): Primary | ICD-10-CM

## 2025-05-21 DIAGNOSIS — J98.11 PULMONARY ATELECTASIS: ICD-10-CM

## 2025-05-21 DIAGNOSIS — R06.2 WHEEZING: ICD-10-CM

## 2025-05-21 DIAGNOSIS — J18.9 PNEUMONIA OF BOTH LOWER LOBES DUE TO INFECTIOUS ORGANISM: ICD-10-CM

## 2025-05-21 PROCEDURE — 71046 X-RAY EXAM CHEST 2 VIEWS: CPT | Mod: 26 | Performed by: RADIOLOGY

## 2025-05-21 PROCEDURE — 71046 X-RAY EXAM CHEST 2 VIEWS: CPT

## 2025-05-21 PROCEDURE — G0463 HOSPITAL OUTPT CLINIC VISIT: HCPCS | Mod: 25

## 2025-05-21 RX ORDER — CEFDINIR 300 MG/1
300 CAPSULE ORAL 2 TIMES DAILY
Qty: 20 CAPSULE | Refills: 0 | Status: SHIPPED | OUTPATIENT
Start: 2025-05-21 | End: 2025-05-31

## 2025-05-21 ASSESSMENT — ENCOUNTER SYMPTOMS
HEMATOLOGIC/LYMPHATIC NEGATIVE: 1
GASTROINTESTINAL NEGATIVE: 1
ACTIVITY CHANGE: 1
PSYCHIATRIC NEGATIVE: 1
WHEEZING: 1
FATIGUE: 1
MUSCULOSKELETAL NEGATIVE: 1
NEUROLOGICAL NEGATIVE: 1
APPETITE CHANGE: 1
SHORTNESS OF BREATH: 1
COUGH: 1

## 2025-05-21 ASSESSMENT — PAIN SCALES - GENERAL: PAINLEVEL_OUTOF10: NO PAIN (0)

## 2025-05-21 NOTE — LETTER
May 21, 2025      Jayashree Vieira  [address] [facility] [address]        Dear ,    We are writing to inform you of your test results.      If you have any questions or concerns, please call the clinic at the number listed above.       Sincerely,    Results for orders placed or performed in visit on 05/21/25   XR Chest 2 Views     Status: None    Narrative    PROCEDURE: XR CHEST 2 VIEWS 5/21/2025 11:47 AM    HISTORY: SOB (shortness of breath); Subacute cough; Wheezing    COMPARISONS: None.    TECHNIQUE: 2 views.    FINDINGS: Heart and pulmonary vasculature are normal. No large  confluent infiltrate is seen but there is some mild patchy density at  the lung bases. No pleural effusion is seen.    Very mild degenerative change is seen in the spine.         Impression    IMPRESSION: Mild probable atelectasis at the lung bases.    DRAGAN HAWLEY MD         SYSTEM ID:  [id number]

## 2025-05-21 NOTE — PROGRESS NOTES
Jayashree Vieira  1955    ASSESSMENT/PLAN      Presents to rapid clinic with cough, congestion, wheezing worsening over the last month.  Patient does have history of seasonal allergies and is taking Singulair.  Patient has been using Mucinex with limited benefit.  Patient does have a inhaler, albuterol, which she has been using with improvement in her symptoms.  Patient has no known history of asthma or COPD.  Patient's vitals are stable and she appears nontoxic.  Chest x-ray obtained and shows bilateral lower lobe infiltrates versus atelectasis.  Will treat for pneumonia given x-ray results.  Discussed use of incentive spirometry with patient.  Discussed with patient to follow-up with primary care for reevaluation with consideration to pulmonary function test or repeat chest x-ray for evaluation.      1. SOB (shortness of breath) (Primary)  2. Subacute cough  3. Wheezing    - XR Chest 2 Views    4. Pulmonary atelectasis    - Other Respiratory Supplies for DME - ONLY FOR DME     5. Pneumonia of both lower lobes due to infectious organism    - cefdinir (OMNICEF) 300 MG capsule; Take 1 capsule (300 mg) by mouth 2 times daily for 10 days.  Dispense: 20 capsule; Refill: 0     - Symptomatic treatment - Encouraged fluids, salt water gargles, honey, humidifier, saline nasal spray, lozenges, tea, soup, smoothies, popsicles, topical vapor rub, rest, etc   - May use over-the-counter Tylenol or ibuprofen PRN  - Follow up as needed for new or worsening symptoms        *A shared decision making model was used.   *Patient and/or associated parties understood and were agreeable to treatment plan.   *Plan and all results were discussed.   *Explanation of diagnosis, treatment options and risk and benefits of medications reviewed with patient.    *Time was taken to answer all questions.   *Red flags symptoms were discussed and patient was advised when they should return for reevaluation or for prompt emergency evaluation.  "  *Patient was given verbal and written instructions on plan of care. Instructions were printed or are available on [employer] on electronic AVS.   *We discussed potential side effects of any prescribed or recommended therapies, as well as expectations for response to treatments.  *Patient discharged in stable condition    Freda Montiel CNP  [facility]    SUBJECTIVE  CHIEF COMPLAINT/ REASON FOR VISIT  Patient presents with:  Cough     HISTORY OF PRESENT ILLNESS  Jayashree Vieira is a pleasant 69 year old female presents to rapid clinic today with cough, congestion, wheezing worsening over the last month.  Patient does have history of seasonal allergies and is taking Singulair.  Patient has been using Mucinex with limited benefit.  Patient does have a inhaler, albuterol which she has been using with improvement in her symptoms Patient has no known history of asthma or COPD.      I have reviewed the nursing notes.  I have reviewed allergies, medication list, problem list, and past medical history.    REVIEW OF SYSTEMS  Review of Systems   Constitutional:  Positive for activity change, appetite change and fatigue.   HENT:  Positive for congestion.    Respiratory:  Positive for cough, shortness of breath and wheezing.    Gastrointestinal: Negative.    Genitourinary: Negative.    Musculoskeletal: Negative.    Skin: Negative.    Neurological: Negative.    Hematological: Negative.    Psychiatric/Behavioral: Negative.     All other systems reviewed and are negative.       VITAL SIGNS  Vitals:    05/21/25 1123   BP: (!) 152/96   BP Location: Right arm   Patient Position: Sitting   Cuff Size: Adult Regular   Pulse: 71   Resp: 20   Temp: 97.5  F (36.4  C)   TempSrc: Tympanic   SpO2: 100%   Weight: 98.9 kg (218 lb)   Height: 1.626 m (5' 4\")      Body mass index is 37.42 kg/m .      OBJECTIVE  PHYSICAL EXAM  Physical Exam  Vitals and nursing note reviewed.   Constitutional:       Appearance: Normal " appearance.   HENT:      Head: Normocephalic.      Right Ear: Tympanic membrane normal.      Left Ear: Tympanic membrane normal.      Nose: Nose normal.      Mouth/Throat:      Mouth: Mucous membranes are moist.   Eyes:      Pupils: Pupils are equal, round, and reactive to light.   Cardiovascular:      Rate and Rhythm: Normal rate and regular rhythm.      Pulses: Normal pulses.      Heart sounds: Normal heart sounds.   Pulmonary:      Effort: Pulmonary effort is normal.      Breath sounds: Wheezing present.   Abdominal:      Palpations: Abdomen is soft.   Musculoskeletal:         General: Normal range of motion.      Cervical back: Normal range of motion.   Skin:     General: Skin is warm and dry.      Capillary Refill: Capillary refill takes less than 2 seconds.   Neurological:      General: No focal deficit present.      Mental Status: She is alert.            DIAGNOSTICS  Results for orders placed or performed in visit on 05/21/25   XR Chest 2 Views     Status: None    Narrative    PROCEDURE: XR CHEST 2 VIEWS 5/21/2025 11:47 AM    HISTORY: SOB (shortness of breath); Subacute cough; Wheezing    COMPARISONS: None.    TECHNIQUE: 2 views.    FINDINGS: Heart and pulmonary vasculature are normal. No large  confluent infiltrate is seen but there is some mild patchy density at  the lung bases. No pleural effusion is seen.    Very mild degenerative change is seen in the spine.         Impression    IMPRESSION: Mild probable atelectasis at the lung bases.    DRAGAN HAWLEY MD         SYSTEM ID:  [id number]

## 2025-05-21 NOTE — PROGRESS NOTES
"Chief Complaint   Patient presents with    Cough   Patient is here for symptoms that have been getting worse since they started a month ago.      FOOD SECURITY SCREENING QUESTIONS  Hunger Vital Signs:  Within the past 12 months we worried whether our food would run out before we got money to buy more. Never  Within the past 12 months the food we bought just didn't last and we didn't have money to get more. Never  Callie Dc 5/21/2025 11:25 AM      Initial BP (!) 152/96 (BP Location: Right arm, Patient Position: Sitting, Cuff Size: Adult Regular)   Pulse 71   Temp 97.5  F (36.4  C) (Tympanic)   Resp 20   Ht 1.626 m (5' 4\")   Wt 98.9 kg (218 lb)   LMP 10/13/2014 (Approximate)   SpO2 100%   BMI 37.42 kg/m   Estimated body mass index is 37.42 kg/m  as calculated from the following:    Height as of this encounter: 1.626 m (5' 4\").    Weight as of this encounter: 98.9 kg (218 lb).  Medication Reconciliation: complete    Callie Dc   "

## (undated) DEVICE — BNDG ELASTIC 3"X5YDS UNSTERILE 6611-30

## (undated) DEVICE — GUIDE WIRE ARTHREX THRD W/TROCAR TIP .062" AR-8941KT

## (undated) DEVICE — SLEEVE COMPRESSION SCD KNEE MED 74022

## (undated) DEVICE — CAST PADDING 4" COTTON WEBRIL UNSTERILE 9084

## (undated) DEVICE — PREP CHLORAPREP 26ML TINTED ORANGE  260815

## (undated) DEVICE — DRSG GAUZE 4X4" 3033

## (undated) DEVICE — BLADE KNIFE SURG 15 SAFETY 373915

## (undated) DEVICE — TOURNIQUET SGL BLADDER 18"X4" RED 5921-218-135

## (undated) DEVICE — SOL WATER 1500ML

## (undated) DEVICE — DRAPE C-ARM 60X42" 1013

## (undated) DEVICE — GLOVE SENSICARE 8.5 MSG1085 LATEX FREE

## (undated) DEVICE — GLOVE PROTEXIS POWDER FREE SMT 8.5 2D72PT85X

## (undated) DEVICE — PACK MAJOR EXTREMITY SOP15MEFCA

## (undated) DEVICE — DRAPE STERI TOWEL LG 1010

## (undated) DEVICE — DRAPE TOWEL 17X27" BLUE LF DISP 28700-004

## (undated) DEVICE — SU ETHILON 3-0 FS-1 18" 669H

## (undated) DEVICE — CAST PLASTER SPLINT 3X15" 7393

## (undated) DEVICE — ESU GROUND PAD ADULT W/CORD E7507

## (undated) DEVICE — SU VICRYL 2-0 CT-2 27" UND J269H

## (undated) DEVICE — COVER LIGHT HANDLE LT-F02

## (undated) RX ORDER — GLYCOPYRROLATE 0.2 MG/ML
INJECTION, SOLUTION INTRAMUSCULAR; INTRAVENOUS
Status: DISPENSED
Start: 2020-06-05

## (undated) RX ORDER — BUPIVACAINE HYDROCHLORIDE 2.5 MG/ML
INJECTION, SOLUTION EPIDURAL; INFILTRATION; INTRACAUDAL
Status: DISPENSED
Start: 2020-06-05

## (undated) RX ORDER — FENTANYL CITRATE 50 UG/ML
INJECTION, SOLUTION INTRAMUSCULAR; INTRAVENOUS
Status: DISPENSED
Start: 2020-06-05

## (undated) RX ORDER — CEFAZOLIN SODIUM 1 G/50ML
INJECTION, SOLUTION INTRAVENOUS
Status: DISPENSED
Start: 2020-06-05

## (undated) RX ORDER — PROPOFOL 10 MG/ML
INJECTION, EMULSION INTRAVENOUS
Status: DISPENSED
Start: 2020-06-05

## (undated) RX ORDER — LIDOCAINE HYDROCHLORIDE 20 MG/ML
INJECTION, SOLUTION EPIDURAL; INFILTRATION; INTRACAUDAL; PERINEURAL
Status: DISPENSED
Start: 2020-06-05

## (undated) RX ORDER — DEXAMETHASONE SODIUM PHOSPHATE 4 MG/ML
INJECTION, SOLUTION INTRA-ARTICULAR; INTRALESIONAL; INTRAMUSCULAR; INTRAVENOUS; SOFT TISSUE
Status: DISPENSED
Start: 2020-06-05

## (undated) RX ORDER — HYDROCODONE BITARTRATE AND ACETAMINOPHEN 5; 325 MG/1; MG/1
TABLET ORAL
Status: DISPENSED
Start: 2020-06-05

## (undated) RX ORDER — ONDANSETRON 2 MG/ML
INJECTION INTRAMUSCULAR; INTRAVENOUS
Status: DISPENSED
Start: 2020-06-05